# Patient Record
Sex: FEMALE | Race: WHITE | NOT HISPANIC OR LATINO | Employment: OTHER | URBAN - METROPOLITAN AREA
[De-identification: names, ages, dates, MRNs, and addresses within clinical notes are randomized per-mention and may not be internally consistent; named-entity substitution may affect disease eponyms.]

---

## 2020-02-10 ENCOUNTER — HOSPITAL ENCOUNTER (INPATIENT)
Facility: HOSPITAL | Age: 80
LOS: 4 days | Discharge: NURSING, REHAB OR LONG-TERM CARE | End: 2020-02-14
Attending: EMERGENCY MEDICINE | Admitting: ORTHOPAEDIC SURGERY

## 2020-02-10 ENCOUNTER — APPOINTMENT (OUTPATIENT)
Dept: GENERAL RADIOLOGY | Facility: HOSPITAL | Age: 80
End: 2020-02-10

## 2020-02-10 DIAGNOSIS — J44.9 CHRONIC OBSTRUCTIVE PULMONARY DISEASE, UNSPECIFIED COPD TYPE (HCC): ICD-10-CM

## 2020-02-10 DIAGNOSIS — I10 ESSENTIAL HYPERTENSION: ICD-10-CM

## 2020-02-10 DIAGNOSIS — S72.001A CLOSED RIGHT HIP FRACTURE, INITIAL ENCOUNTER (HCC): Primary | ICD-10-CM

## 2020-02-10 PROBLEM — J45.909 ASTHMA: Status: ACTIVE | Noted: 2020-02-10
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
## 2020-02-10 LAB
ABO GROUP BLD: NORMAL
ALBUMIN SERPL-MCNC: 4.5 G/DL (ref 3.5–5.2)
ALBUMIN/GLOB SERPL: 1.7 G/DL
ALP SERPL-CCNC: 126 U/L (ref 39–117)
ALT SERPL W P-5'-P-CCNC: 20 U/L (ref 1–33)
ANION GAP SERPL CALCULATED.3IONS-SCNC: 15.7 MMOL/L (ref 5–15)
AST SERPL-CCNC: 21 U/L (ref 1–32)
BASOPHILS # BLD AUTO: 0.03 10*3/MM3 (ref 0–0.2)
BASOPHILS NFR BLD AUTO: 0.5 % (ref 0–1.5)
BILIRUB SERPL-MCNC: 0.6 MG/DL (ref 0.2–1.2)
BLD GP AB SCN SERPL QL: NEGATIVE
BUN BLD-MCNC: 11 MG/DL (ref 8–23)
BUN/CREAT SERPL: 17.2 (ref 7–25)
CALCIUM SPEC-SCNC: 9.7 MG/DL (ref 8.6–10.5)
CHLORIDE SERPL-SCNC: 98 MMOL/L (ref 98–107)
CO2 SERPL-SCNC: 23.3 MMOL/L (ref 22–29)
CREAT BLD-MCNC: 0.64 MG/DL (ref 0.57–1)
DEPRECATED RDW RBC AUTO: 43.3 FL (ref 37–54)
EOSINOPHIL # BLD AUTO: 0 10*3/MM3 (ref 0–0.4)
EOSINOPHIL NFR BLD AUTO: 0 % (ref 0.3–6.2)
ERYTHROCYTE [DISTWIDTH] IN BLOOD BY AUTOMATED COUNT: 13.6 % (ref 12.3–15.4)
GFR SERPL CREATININE-BSD FRML MDRD: 90 ML/MIN/1.73
GLOBULIN UR ELPH-MCNC: 2.6 GM/DL
GLUCOSE BLD-MCNC: 103 MG/DL (ref 65–99)
HCT VFR BLD AUTO: 41.5 % (ref 34–46.6)
HGB BLD-MCNC: 14 G/DL (ref 12–15.9)
IMM GRANULOCYTES # BLD AUTO: 0.03 10*3/MM3 (ref 0–0.05)
IMM GRANULOCYTES NFR BLD AUTO: 0.5 % (ref 0–0.5)
INR PPP: 0.92 (ref 0.9–1.1)
LYMPHOCYTES # BLD AUTO: 0.66 10*3/MM3 (ref 0.7–3.1)
LYMPHOCYTES NFR BLD AUTO: 10 % (ref 19.6–45.3)
MCH RBC QN AUTO: 29.5 PG (ref 26.6–33)
MCHC RBC AUTO-ENTMCNC: 33.7 G/DL (ref 31.5–35.7)
MCV RBC AUTO: 87.6 FL (ref 79–97)
MONOCYTES # BLD AUTO: 0.3 10*3/MM3 (ref 0.1–0.9)
MONOCYTES NFR BLD AUTO: 4.6 % (ref 5–12)
NEUTROPHILS # BLD AUTO: 5.55 10*3/MM3 (ref 1.7–7)
NEUTROPHILS NFR BLD AUTO: 84.4 % (ref 42.7–76)
NRBC BLD AUTO-RTO: 0 /100 WBC (ref 0–0.2)
PLATELET # BLD AUTO: 176 10*3/MM3 (ref 140–450)
PMV BLD AUTO: 9.6 FL (ref 6–12)
POTASSIUM BLD-SCNC: 3.6 MMOL/L (ref 3.5–5.2)
PROT SERPL-MCNC: 7.1 G/DL (ref 6–8.5)
PROTHROMBIN TIME: 12.1 SECONDS (ref 11.7–14.2)
RBC # BLD AUTO: 4.74 10*6/MM3 (ref 3.77–5.28)
RH BLD: POSITIVE
SODIUM BLD-SCNC: 137 MMOL/L (ref 136–145)
T&S EXPIRATION DATE: NORMAL
WBC NRBC COR # BLD: 6.57 10*3/MM3 (ref 3.4–10.8)

## 2020-02-10 PROCEDURE — 93005 ELECTROCARDIOGRAM TRACING: CPT | Performed by: EMERGENCY MEDICINE

## 2020-02-10 PROCEDURE — 93010 ELECTROCARDIOGRAM REPORT: CPT | Performed by: INTERNAL MEDICINE

## 2020-02-10 PROCEDURE — 25010000002 ONDANSETRON PER 1 MG: Performed by: EMERGENCY MEDICINE

## 2020-02-10 PROCEDURE — 99284 EMERGENCY DEPT VISIT MOD MDM: CPT

## 2020-02-10 PROCEDURE — 86850 RBC ANTIBODY SCREEN: CPT | Performed by: EMERGENCY MEDICINE

## 2020-02-10 PROCEDURE — 86901 BLOOD TYPING SEROLOGIC RH(D): CPT | Performed by: EMERGENCY MEDICINE

## 2020-02-10 PROCEDURE — 71045 X-RAY EXAM CHEST 1 VIEW: CPT

## 2020-02-10 PROCEDURE — 85610 PROTHROMBIN TIME: CPT | Performed by: EMERGENCY MEDICINE

## 2020-02-10 PROCEDURE — 85025 COMPLETE CBC W/AUTO DIFF WBC: CPT | Performed by: EMERGENCY MEDICINE

## 2020-02-10 PROCEDURE — 86900 BLOOD TYPING SEROLOGIC ABO: CPT | Performed by: EMERGENCY MEDICINE

## 2020-02-10 PROCEDURE — 80053 COMPREHEN METABOLIC PANEL: CPT | Performed by: EMERGENCY MEDICINE

## 2020-02-10 PROCEDURE — 73502 X-RAY EXAM HIP UNI 2-3 VIEWS: CPT

## 2020-02-10 PROCEDURE — 25010000002 MORPHINE PER 10 MG: Performed by: INTERNAL MEDICINE

## 2020-02-10 PROCEDURE — 25010000002 HYDROMORPHONE PER 4 MG: Performed by: EMERGENCY MEDICINE

## 2020-02-10 RX ORDER — IPRATROPIUM BROMIDE AND ALBUTEROL SULFATE 2.5; .5 MG/3ML; MG/3ML
3 SOLUTION RESPIRATORY (INHALATION) EVERY 4 HOURS PRN
Status: DISCONTINUED | OUTPATIENT
Start: 2020-02-10 | End: 2020-02-14 | Stop reason: HOSPADM

## 2020-02-10 RX ORDER — CEFAZOLIN SODIUM 2 G/100ML
2 INJECTION, SOLUTION INTRAVENOUS
Status: COMPLETED | OUTPATIENT
Start: 2020-02-11 | End: 2020-02-11

## 2020-02-10 RX ORDER — HYDROCODONE BITARTRATE AND ACETAMINOPHEN 5; 325 MG/1; MG/1
1 TABLET ORAL EVERY 6 HOURS PRN
Status: DISCONTINUED | OUTPATIENT
Start: 2020-02-10 | End: 2020-02-12

## 2020-02-10 RX ORDER — SODIUM CHLORIDE 0.9 % (FLUSH) 0.9 %
10 SYRINGE (ML) INJECTION EVERY 12 HOURS SCHEDULED
Status: DISCONTINUED | OUTPATIENT
Start: 2020-02-10 | End: 2020-02-14 | Stop reason: HOSPADM

## 2020-02-10 RX ORDER — SENNA AND DOCUSATE SODIUM 50; 8.6 MG/1; MG/1
2 TABLET, FILM COATED ORAL 2 TIMES DAILY
Status: DISCONTINUED | OUTPATIENT
Start: 2020-02-10 | End: 2020-02-14 | Stop reason: HOSPADM

## 2020-02-10 RX ORDER — CETIRIZINE HYDROCHLORIDE 10 MG/1
10 TABLET ORAL DAILY
COMMUNITY

## 2020-02-10 RX ORDER — HYDROMORPHONE HYDROCHLORIDE 1 MG/ML
0.5 INJECTION, SOLUTION INTRAMUSCULAR; INTRAVENOUS; SUBCUTANEOUS ONCE
Status: COMPLETED | OUTPATIENT
Start: 2020-02-10 | End: 2020-02-10

## 2020-02-10 RX ORDER — SODIUM CHLORIDE 0.9 % (FLUSH) 0.9 %
10 SYRINGE (ML) INJECTION AS NEEDED
Status: DISCONTINUED | OUTPATIENT
Start: 2020-02-10 | End: 2020-02-14 | Stop reason: HOSPADM

## 2020-02-10 RX ORDER — SODIUM CHLORIDE, SODIUM LACTATE, POTASSIUM CHLORIDE, CALCIUM CHLORIDE 600; 310; 30; 20 MG/100ML; MG/100ML; MG/100ML; MG/100ML
100 INJECTION, SOLUTION INTRAVENOUS CONTINUOUS
Status: DISCONTINUED | OUTPATIENT
Start: 2020-02-10 | End: 2020-02-12

## 2020-02-10 RX ORDER — ONDANSETRON 2 MG/ML
4 INJECTION INTRAMUSCULAR; INTRAVENOUS EVERY 6 HOURS PRN
Status: DISCONTINUED | OUTPATIENT
Start: 2020-02-10 | End: 2020-02-14 | Stop reason: HOSPADM

## 2020-02-10 RX ORDER — ACETAMINOPHEN 650 MG/1
650 SUPPOSITORY RECTAL EVERY 4 HOURS PRN
Status: DISCONTINUED | OUTPATIENT
Start: 2020-02-10 | End: 2020-02-12

## 2020-02-10 RX ORDER — CETIRIZINE HYDROCHLORIDE 10 MG/1
10 TABLET ORAL DAILY
Status: DISCONTINUED | OUTPATIENT
Start: 2020-02-10 | End: 2020-02-14 | Stop reason: HOSPADM

## 2020-02-10 RX ORDER — ACETAMINOPHEN 160 MG/5ML
650 SOLUTION ORAL EVERY 4 HOURS PRN
Status: DISCONTINUED | OUTPATIENT
Start: 2020-02-10 | End: 2020-02-12

## 2020-02-10 RX ORDER — ACETAMINOPHEN 325 MG/1
650 TABLET ORAL EVERY 4 HOURS PRN
Status: DISCONTINUED | OUTPATIENT
Start: 2020-02-10 | End: 2020-02-14 | Stop reason: HOSPADM

## 2020-02-10 RX ORDER — ONDANSETRON 2 MG/ML
4 INJECTION INTRAMUSCULAR; INTRAVENOUS ONCE
Status: COMPLETED | OUTPATIENT
Start: 2020-02-10 | End: 2020-02-10

## 2020-02-10 RX ORDER — MORPHINE SULFATE 2 MG/ML
2 INJECTION, SOLUTION INTRAMUSCULAR; INTRAVENOUS
Status: DISCONTINUED | OUTPATIENT
Start: 2020-02-10 | End: 2020-02-12

## 2020-02-10 RX ADMIN — CETIRIZINE HYDROCHLORIDE 10 MG: 10 TABLET, FILM COATED ORAL at 20:30

## 2020-02-10 RX ADMIN — ONDANSETRON 4 MG: 2 INJECTION INTRAMUSCULAR; INTRAVENOUS at 12:45

## 2020-02-10 RX ADMIN — HYDROCODONE BITARTRATE AND ACETAMINOPHEN 1 TABLET: 5; 325 TABLET ORAL at 20:30

## 2020-02-10 RX ADMIN — SODIUM CHLORIDE, POTASSIUM CHLORIDE, SODIUM LACTATE AND CALCIUM CHLORIDE 100 ML/HR: 600; 310; 30; 20 INJECTION, SOLUTION INTRAVENOUS at 12:54

## 2020-02-10 RX ADMIN — SODIUM CHLORIDE, POTASSIUM CHLORIDE, SODIUM LACTATE AND CALCIUM CHLORIDE 100 ML/HR: 600; 310; 30; 20 INJECTION, SOLUTION INTRAVENOUS at 23:03

## 2020-02-10 RX ADMIN — MORPHINE SULFATE 2 MG: 2 INJECTION, SOLUTION INTRAMUSCULAR; INTRAVENOUS at 23:03

## 2020-02-10 RX ADMIN — MORPHINE SULFATE 2 MG: 2 INJECTION, SOLUTION INTRAMUSCULAR; INTRAVENOUS at 15:42

## 2020-02-10 RX ADMIN — MORPHINE SULFATE 2 MG: 2 INJECTION, SOLUTION INTRAMUSCULAR; INTRAVENOUS at 19:19

## 2020-02-10 RX ADMIN — SENNOSIDES AND DOCUSATE SODIUM 2 TABLET: 8.6; 5 TABLET ORAL at 20:30

## 2020-02-10 RX ADMIN — HYDROMORPHONE HYDROCHLORIDE 0.5 MG: 1 INJECTION, SOLUTION INTRAMUSCULAR; INTRAVENOUS; SUBCUTANEOUS at 12:49

## 2020-02-10 NOTE — PROGRESS NOTES
Discharge Planning Assessment  [facility]     Patient Name: Deborah Mcclelland  MRN: [id number]  Today's Date: 2/10/2020    Admit Date: 2/10/2020    Discharge Needs Assessment     Row Name 02/10/20 1618       Living Environment    Lives With  alone    Current Living Arrangements  home/apartment/condo    Potentially Unsafe Housing Conditions  other (see comments)    Provides Primary Care For  no one    Family Caregiver if Needed  child(jennifer), adult    Quality of Family Relationships  helpful;involved;supportive    Able to Return to Prior Arrangements  yes       Resource/Environmental Concerns    Resource/Environmental Concerns  none    Transportation Concerns  car, none       Transition Planning    Patient/Family Anticipates Transition to  home    Patient/Family Anticipated Services at Transition  home health care;skilled nursing    Transportation Anticipated  family or friend will provide       Discharge Needs Assessment    Readmission Within the Last 30 Days  no previous admission in last 30 days    Concerns to be Addressed  no discharge needs identified;denies needs/concerns at this time    Equipment Currently Used at Home  none    Anticipated Changes Related to Illness  none    Equipment Needed After Discharge  none    Outpatient/Agency/Support Group Needs  skilled nursing facility;homecare agency    Discharge Facility/Level of Care Needs  home with home health;nursing facility, skilled        Discharge Plan     Row Name 02/10/20 1620       Plan    Plan  Follow up after surgery; HH/SNF list     Patient/Family in Agreement with Plan  yes    Plan Comments  CCP met with patient, patient's son and daughter (Selam [phone] and Musa [phone]) at bedside. CCP role explained and discharge planning discussed. Face sheet verified and IMM noted. Patient's PCP is Dr. Ryder. Patient lives alone with 4 steps to the entrance of her single story home. Patient is independent with her ADLS and does not use DME.  Patient has used home health in the past but could not recall which agency. Patient denies any subacute rehab in the past. Patient's preferred pharmacy is [facility] [address]. Patient to have possible surgery. CCP left HH/SNF list at bedside. CCP will follow for skilled needs and PT eval to help determine discharge plan. Gaye ANDRADE            Destination      Coordination has not been started for this encounter.      Durable Medical Equipment      Coordination has not been started for this encounter.      Dialysis/Infusion      Coordination has not been started for this encounter.      Home Medical Care      Coordination has not been started for this encounter.      Therapy      Coordination has not been started for this encounter.      Community Resources      Coordination has not been started for this encounter.          Demographic Summary     Row Name 02/10/20 1617       General Information    Admission Type  inpatient    Arrived From  emergency department    Required Notices Provided  Important Message from Medicare    Referral Source  admission list    Reason for Consult  discharge planning    Preferred Language  English        Functional Status     Row Name 02/10/20 1618       Functional Status    Usual Activity Tolerance  good    Current Activity Tolerance  good       Functional Status, IADL    Medications  independent    Meal Preparation  independent    Housekeeping  independent    Laundry  independent    Shopping  independent       Mental Status    General Appearance WDL  WDL       Mental Status Summary    Recent Changes in Mental Status/Cognitive Functioning  no changes        Psychosocial    No documentation.       Abuse/Neglect    No documentation.       Legal    No documentation.       Substance Abuse    No documentation.       Patient Forms    No documentation.           LUPE Oquendo

## 2020-02-10 NOTE — ED TRIAGE NOTES
Tripped and fell on concrete patio.  Did not hit head.  No loc.  No neck or back pain.  C/o right hip pain

## 2020-02-10 NOTE — PLAN OF CARE
Received from ER @ 1440, pt fell at home on stairs, admitted with R hip fracture, admitted to [facility], Dr. Zach Roy will see in the am, purewick in place, bilateral SCDs, accumax pump, waffle boots ordered, but not to floor, morphine for pain, regular diet, npo after midnight, plan is for surgery tomorrow

## 2020-02-10 NOTE — H&P
Patient Name:  Deborah Mcclelland  YOB: 1940  MRN:  [id number]  Admit Date:  2/10/2020  Patient Care Team:  Sean Ryder MD as PCP - General (Internal Medicine)      Subjective   History Present Illness     Chief Complaint   Patient presents with   • Fall   • Hip Pain       Ms. Mcclelland is a 79 y.o. with a history of COPD that presents to [facility] after sustaining a mechanical fall at home this morning. Patient was leaving the house to go to the [facility] when she tripped going down her stairs. She reports falling backwards but denies head injury, LOC, chest pain, palpitations, SOA, edema, fever, chills, nausea and vomiting. She lives at home alone and reports previous left hip replacement in 2012.     History of Present Illness  Review of Systems   Constitutional: Negative for chills and fever.   HENT: Negative for congestion and facial swelling.    Eyes: Negative for discharge and itching.   Respiratory: Negative for chest tightness and shortness of breath.    Cardiovascular: Negative for chest pain, palpitations and leg swelling.   Gastrointestinal: Negative for nausea and vomiting.   Endocrine: Negative for cold intolerance and heat intolerance.   Genitourinary: Negative for difficulty urinating and dysuria.   Musculoskeletal: Negative for arthralgias and back pain.   Skin: Negative for color change and pallor.   Allergic/Immunologic: Negative for environmental allergies and food allergies.   Neurological: Negative for dizziness and headaches.   Hematological: Negative for adenopathy. Does not bruise/bleed easily.   Psychiatric/Behavioral: Negative for agitation and behavioral problems.        Personal History     Past Medical History:   Diagnosis Date   • Asthma    • COPD (chronic obstructive pulmonary disease) (CMS/[facility])      Past Surgical History:   Procedure Laterality Date   • HIP SURGERY Left    • KNEE SURGERY Bilateral      History reviewed. No pertinent family  history.  Social History     Tobacco Use   • Smoking status: Never Smoker   Substance Use Topics   • Alcohol use: Never     Frequency: Never   • Drug use: Never     No current facility-administered medications on file prior to encounter.      Current Outpatient Medications on File Prior to Encounter   Medication Sig Dispense Refill   • cetirizine (zyrTEC) 10 MG tablet Take 10 mg by mouth Daily.     • ipratropium-albuterol (COMBIVENT RESPIMAT)  MCG/ACT inhaler Inhale 1 puff 4 (Four) Times a Day As Needed for Wheezing.     • Unable to find 1 each 1 (One) Time. Med Name: Breathing med starts with letter M       No Known Allergies    Objective    Objective     Vital Signs  Temp:  [98.3 °F (36.8 °C)] 98.3 °F (36.8 °C)  Heart Rate:  [89] 89  Resp:  [16] 16  BP: (172-200)/() 186/80  SpO2:  [100 %] 100 %  on   ;   Device (Oxygen Therapy): room air  There is no height or weight on file to calculate BMI.    Physical Exam   Constitutional: She is oriented to person, place, and time. She appears well-developed and well-nourished. No distress.   HENT:   Head: Normocephalic and atraumatic.   Eyes: Pupils are equal, round, and reactive to light. EOM are normal.   Neck: Normal range of motion. Neck supple.   Cardiovascular: Normal rate and regular rhythm.   Pulmonary/Chest: Effort normal and breath sounds normal. No respiratory distress.   Abdominal: Soft. Bowel sounds are normal. She exhibits no distension. There is no tenderness.   Musculoskeletal: She exhibits tenderness. She exhibits no edema.   Neurological: She is alert and oriented to person, place, and time.   Psychiatric: She has a normal mood and affect. Her behavior is normal.   Nursing note and vitals reviewed.      Results Review:  I reviewed the patient's new clinical results.  I reviewed the patient's new imaging results and agree with the interpretation.  I reviewed the patient's other test results and agree with the interpretation  I personally viewed  and interpreted the patient's EKG/Telemetry data  Discussed with ED provider.    Lab Results (last 24 hours)     Procedure Component Value Units Date/Time    CBC & Differential [[id number]] Collected:  02/10/20 1247    Specimen:  Blood Updated:  02/10/20 1306    Narrative:       The following orders were created for panel order CBC & Differential.  Procedure                               Abnormality         Status                     ---------                               -----------         ------                     CBC Auto Differential[[id number]]        Abnormal            Final result                 Please view results for these tests on the individual orders.    Comprehensive Metabolic Panel [[id number]]  (Abnormal) Collected:  02/10/20 1247    Specimen:  Blood Updated:  02/10/20 1327     Glucose 103 mg/dL      BUN 11 mg/dL      Creatinine 0.64 mg/dL      Sodium 137 mmol/L      Potassium 3.6 mmol/L      Chloride 98 mmol/L      CO2 23.3 mmol/L      Calcium 9.7 mg/dL      Total Protein 7.1 g/dL      Albumin 4.50 g/dL      ALT (SGPT) 20 U/L      AST (SGOT) 21 U/L      Alkaline Phosphatase 126 U/L      Total Bilirubin 0.6 mg/dL      eGFR Non African Amer 90 mL/min/1.73      Globulin 2.6 gm/dL      A/G Ratio 1.7 g/dL      BUN/Creatinine Ratio 17.2     Anion Gap 15.7 mmol/L     Narrative:       GFR Normal >60  Chronic Kidney Disease <60  Kidney Failure <15      Protime-INR [[id number]]  (Normal) Collected:  02/10/20 1247    Specimen:  Blood Updated:  02/10/20 1316     Protime 12.1 Seconds      INR 0.92    CBC Auto Differential [[id number]]  (Abnormal) Collected:  02/10/20 1247    Specimen:  Blood Updated:  02/10/20 1306     WBC 6.57 10*3/mm3      RBC 4.74 10*6/mm3      Hemoglobin 14.0 g/dL      Hematocrit 41.5 %      MCV 87.6 fL      MCH 29.5 pg      MCHC 33.7 g/dL      RDW 13.6 %      RDW-SD 43.3 fl      MPV 9.6 fL      Platelets 176 10*3/mm3      Neutrophil % 84.4 %      Lymphocyte % 10.0 %      Monocyte % 4.6 %       Eosinophil % 0.0 %      Basophil % 0.5 %      Immature Grans % 0.5 %      Neutrophils, Absolute 5.55 10*3/mm3      Lymphocytes, Absolute 0.66 10*3/mm3      Monocytes, Absolute 0.30 10*3/mm3      Eosinophils, Absolute 0.00 10*3/mm3      Basophils, Absolute 0.03 10*3/mm3      Immature Grans, Absolute 0.03 10*3/mm3      nRBC 0.0 /100 WBC           Imaging Results (Last 24 Hours)     Procedure Component Value Units Date/Time    XR Chest 1 View [[id number]] Collected:  02/10/20 1227     Updated:  02/10/20 1236    Narrative:       XR CHEST 1 VW-     HISTORY: Female who is 79 years-old,  preoperative evaluation     TECHNIQUE: Frontal views of the chest     COMPARISON: 06/25/2013     FINDINGS: Heart size is normal. Aorta is calcified. Pulmonary  vasculature is unremarkable. No focal pulmonary consolidation, pleural  effusion, or pneumothorax is seen, limited by supine positioning.  Chronic appearing right rib deformities.       Impression:       No focal pulmonary consolidation.     This report was finalized on 2/10/2020 12:33 PM by Dr. Kendall Sanford M.D.       XR Hip With or Without Pelvis 2 - 3 View Right [[id number]] Collected:  02/10/20 1226     Updated:  02/10/20 1230    Narrative:       XR HIP W OR WO PELVIS 2-3 VIEW RIGHT-     INDICATIONS: Trauma     TECHNIQUE: Frontal view of the pelvis, 2 views of the right hip     COMPARISON: None available     FINDINGS:  Angulated subcapital right femoral neck fracture is noted. No other  fractures are identified. The bones are diffusely osteopenic. Left hip  arthroplasty hardware is partly included. Degenerative changes are seen  in the partly included lower lumbar spine. Nonspecific pelvic soft  tissue calcifications.          Impression:          Right femoral neck fracture.     This report was finalized on 2/10/2020 12:27 PM by Dr. Kendall Sanford M.D.                  ECG 12 Lead   Final Result   HEART RATE= 72  bpm   RR Interval= 836  ms   WV Interval= 171  ms   P  Horizontal Axis= 7  deg   P Front Axis= 81  deg   QRSD Interval= 102  ms   QT Interval= 419  ms   QRS Axis= -32  deg   T Wave Axis=   deg   - ABNORMAL ECG -   Sinus rhythm   Inferior infarct, old   Minimal ST elevation, anterior leads New compared to previous   Electronically Signed By: Kevan Mustafa ([facility]) 10-Feb-2020 13:15:03   Date and Time of Study: 2020-02-10 12:09:08           Assessment/Plan     Active Hospital Problems    Diagnosis  POA   • **Closed right hip fracture, initial encounter (CMS/[facility]) [S72.001A]  Yes   • COPD (chronic obstructive pulmonary disease) (CMS/[facility]) [J44.9]  Yes   • Asthma [J45.909]  Yes   • Fall [W19.XXXA]  Unknown      Resolved Hospital Problems   No resolved problems to display.     Right femur fracture   - NPO  - ortho to see  - pain control  - bedrest until evaluated by ortho  - PT/OT when appropriate  - CCP for placement at DC    COPD  - stable     · I discussed the patient's findings and my recommendations with patient, family and nursing staff.    VTE Prophylaxis - SCDs.  Code Status - Full code.       KAVON Dale  [facility]  02/10/20  2:09 PM

## 2020-02-10 NOTE — ED PROVIDER NOTES
EMERGENCY DEPARTMENT ENCOUNTER    Room Number:  29/29  Date of encounter:  2/10/2020  PCP: Sean Ryder MD  Historian: Patient       HPI:  Chief Complaint: Right hip pain  A complete HPI/ROS/PMH/PSH/SH/FH are unobtainable due to: None    Context: Deborah Mcclelland is a 79 y.o. female who presents to the ED c/o right hip pain after fall at home.  Patient denies other injury.  Denies loss of consciousness or neck or back pain.  Pain is described as severe and worsened by movement.  Patient does have history of prior left hip surgery but no surgery on the right.  Primary care provider is Dr. Ryder.  Patient lives at home alone.      PAST MEDICAL HISTORY  Active Ambulatory Problems     Diagnosis Date Noted   • No Active Ambulatory Problems     Resolved Ambulatory Problems     Diagnosis Date Noted   • No Resolved Ambulatory Problems     Past Medical History:   Diagnosis Date   • Asthma    • COPD (chronic obstructive pulmonary disease) (CMS/[facility])          PAST SURGICAL HISTORY  Past Surgical History:   Procedure Laterality Date   • HIP SURGERY Left    • KNEE SURGERY Bilateral          FAMILY HISTORY  History reviewed. No pertinent family history.      SOCIAL HISTORY  Social History     Socioeconomic History   • Marital status:      Spouse name: Not on file   • Number of children: Not on file   • Years of education: Not on file   • Highest education level: Not on file   Tobacco Use   • Smoking status: Never Smoker   Substance and Sexual Activity   • Alcohol use: Never     Frequency: Never   • Drug use: Never         ALLERGIES  Patient has no known allergies.       REVIEW OF SYSTEMS  Review of Systems   Constitutional: Negative.  Negative for fever.   HENT: Negative.  Negative for sore throat.    Eyes: Negative.    Respiratory: Negative.  Negative for cough.    Cardiovascular: Negative.  Negative for chest pain.   Gastrointestinal: Negative.    Genitourinary: Negative.  Negative for dysuria.   Musculoskeletal:  Negative.  Negative for back pain.        Right hip pain   Skin: Negative.  Negative for rash.   Neurological: Negative.  Negative for headaches.   All other systems reviewed and are negative.          PHYSICAL EXAM    I have reviewed the triage vital signs and nursing notes.    ED Triage Vitals [02/10/20 1050]   Temp Heart Rate Resp BP SpO2   98.3 °F (36.8 °C) 89 16 (!) 200/111 100 %      Temp src Heart Rate Source Patient Position BP Location FiO2 (%)   Tympanic Monitor -- -- --       Physical Exam  GENERAL: Moderately distressed  HENT: nares patent, atraumatic  EYES: no scleral icterus  CV: regular rhythm, regular rate  RESPIRATORY: normal effort unlabored  ABDOMEN: soft, nontender  MUSCULOSKELETAL: Right hip reveals tenderness to palpation laterally.  There is decreased range of motion.  Distal strength sensation and pulses are grossly intact.  I see no evidence of acute traumatic injury to the spine or other 3 extremities.  NEURO: Strength, sensation, and coordination are grossly intact.  Speech and mentation are unremarkable  SKIN: warm, dry      LAB RESULTS  No results found for this or any previous visit (from the past 24 hour(s)).    Ordered the above labs and independently reviewed the results.      RADIOLOGY  No Radiology Exams Resulted Within Past 24 Hours    I ordered the above noted radiological studies. Reviewed by me and discussed with radiologist.  See dictation for official radiology interpretation.      PROCEDURES  Procedures      MEDICATIONS GIVEN IN ER    Medications   HYDROmorphone (DILAUDID) injection 0.5 mg (has no administration in time range)   ondansetron (ZOFRAN) injection 4 mg (has no administration in time range)         PROGRESS, DATA ANALYSIS, CONSULTS, AND MEDICAL DECISION MAKING    All labs have been independently reviewed by me.  All radiology studies have been reviewed by me and discussed with radiologist dictating the report.   EKG's independently viewed and interpreted by me.   Discussion below represents my analysis of pertinent findings related to patient's condition, differential diagnosis, treatment plan and final disposition.      ED Course as of Feb 10 1222   Mon Feb 10, 2020   1157 MDM exam is consistent with likely right hip fracture.  We will give IV Dilaudid and Zofran to help with pain and nausea.  Will check labs and x-ray, but expect likely admission.    [DB]   1219 I discussed management and care with NP Sangita Vásquez who will admit on behalf of Dr. Hardy.    [DB]   1220 EKG          EKG time: 1209  Rhythm/Rate: Sinus 72  P waves and MO: Normal P waves and MO intervals  QRS, axis: Questionable inferior Q wave, indeterminate axis  ST and T waves: Mild anterior ST elevation    Interpreted Contemporaneously by me, independently viewed  Anterior ST elevation is new compared to 2013      [DB]      ED Course User Index  [DB] Ruperto Bernard MD       AS OF 12:22 PM VITALS:    BP - (!) 200/111  HR - 89  TEMP - 98.3 °F (36.8 °C) (Tympanic)  O2 SATS - 100%      DIAGNOSIS  Final diagnoses:   Closed right hip fracture, initial encounter (CMS/[facility])   Essential hypertension   Chronic obstructive pulmonary disease, unspecified COPD type (CMS/[facility])         DISPOSITION  Admission         Ruperto Bernard MD  02/10/20 1223

## 2020-02-11 ENCOUNTER — ANESTHESIA (OUTPATIENT)
Dept: PERIOP | Facility: HOSPITAL | Age: 80
End: 2020-02-11

## 2020-02-11 ENCOUNTER — APPOINTMENT (OUTPATIENT)
Dept: GENERAL RADIOLOGY | Facility: HOSPITAL | Age: 80
End: 2020-02-11

## 2020-02-11 ENCOUNTER — ANESTHESIA EVENT (OUTPATIENT)
Dept: PERIOP | Facility: HOSPITAL | Age: 80
End: 2020-02-11

## 2020-02-11 LAB
ANION GAP SERPL CALCULATED.3IONS-SCNC: 8.7 MMOL/L (ref 5–15)
BASOPHILS # BLD AUTO: 0.02 10*3/MM3 (ref 0–0.2)
BASOPHILS NFR BLD AUTO: 0.4 % (ref 0–1.5)
BUN BLD-MCNC: 8 MG/DL (ref 8–23)
BUN/CREAT SERPL: 12.9 (ref 7–25)
CALCIUM SPEC-SCNC: 8.6 MG/DL (ref 8.6–10.5)
CHLORIDE SERPL-SCNC: 99 MMOL/L (ref 98–107)
CO2 SERPL-SCNC: 24.3 MMOL/L (ref 22–29)
CREAT BLD-MCNC: 0.62 MG/DL (ref 0.57–1)
DEPRECATED RDW RBC AUTO: 42.4 FL (ref 37–54)
EOSINOPHIL # BLD AUTO: 0.09 10*3/MM3 (ref 0–0.4)
EOSINOPHIL NFR BLD AUTO: 1.8 % (ref 0.3–6.2)
ERYTHROCYTE [DISTWIDTH] IN BLOOD BY AUTOMATED COUNT: 13.4 % (ref 12.3–15.4)
GFR SERPL CREATININE-BSD FRML MDRD: 93 ML/MIN/1.73
GLUCOSE BLD-MCNC: 130 MG/DL (ref 65–99)
HCT VFR BLD AUTO: 37.7 % (ref 34–46.6)
HGB BLD-MCNC: 12.8 G/DL (ref 12–15.9)
IMM GRANULOCYTES # BLD AUTO: 0.01 10*3/MM3 (ref 0–0.05)
IMM GRANULOCYTES NFR BLD AUTO: 0.2 % (ref 0–0.5)
LYMPHOCYTES # BLD AUTO: 0.62 10*3/MM3 (ref 0.7–3.1)
LYMPHOCYTES NFR BLD AUTO: 12.7 % (ref 19.6–45.3)
MCH RBC QN AUTO: 29.4 PG (ref 26.6–33)
MCHC RBC AUTO-ENTMCNC: 34 G/DL (ref 31.5–35.7)
MCV RBC AUTO: 86.7 FL (ref 79–97)
MONOCYTES # BLD AUTO: 0.37 10*3/MM3 (ref 0.1–0.9)
MONOCYTES NFR BLD AUTO: 7.6 % (ref 5–12)
NEUTROPHILS # BLD AUTO: 3.76 10*3/MM3 (ref 1.7–7)
NEUTROPHILS NFR BLD AUTO: 77.3 % (ref 42.7–76)
NRBC BLD AUTO-RTO: 0 /100 WBC (ref 0–0.2)
PLATELET # BLD AUTO: 155 10*3/MM3 (ref 140–450)
PMV BLD AUTO: 9.4 FL (ref 6–12)
POTASSIUM BLD-SCNC: 4 MMOL/L (ref 3.5–5.2)
RBC # BLD AUTO: 4.35 10*6/MM3 (ref 3.77–5.28)
SODIUM BLD-SCNC: 132 MMOL/L (ref 136–145)
WBC NRBC COR # BLD: 4.87 10*3/MM3 (ref 3.4–10.8)

## 2020-02-11 PROCEDURE — 76000 FLUOROSCOPY <1 HR PHYS/QHP: CPT

## 2020-02-11 PROCEDURE — 25010000002 FENTANYL CITRATE (PF) 100 MCG/2ML SOLUTION: Performed by: ANESTHESIOLOGY

## 2020-02-11 PROCEDURE — 25010000002 MORPHINE PER 10 MG: Performed by: INTERNAL MEDICINE

## 2020-02-11 PROCEDURE — 25010000002 SUCCINYLCHOLINE PER 20 MG: Performed by: ANESTHESIOLOGY

## 2020-02-11 PROCEDURE — 73501 X-RAY EXAM HIP UNI 1 VIEW: CPT

## 2020-02-11 PROCEDURE — C9290 INJ, BUPIVACAINE LIPOSOME: HCPCS | Performed by: ORTHOPAEDIC SURGERY

## 2020-02-11 PROCEDURE — 36415 COLL VENOUS BLD VENIPUNCTURE: CPT | Performed by: NURSE PRACTITIONER

## 2020-02-11 PROCEDURE — C1776 JOINT DEVICE (IMPLANTABLE): HCPCS | Performed by: ORTHOPAEDIC SURGERY

## 2020-02-11 PROCEDURE — 25010000002 ONDANSETRON PER 1 MG: Performed by: NURSE PRACTITIONER

## 2020-02-11 PROCEDURE — 25010000002 PHENYLEPHRINE PER 1 ML: Performed by: ANESTHESIOLOGY

## 2020-02-11 PROCEDURE — 94799 UNLISTED PULMONARY SVC/PX: CPT

## 2020-02-11 PROCEDURE — 25010000002 HYDROMORPHONE PER 4 MG: Performed by: ANESTHESIOLOGY

## 2020-02-11 PROCEDURE — 80048 BASIC METABOLIC PNL TOTAL CA: CPT | Performed by: NURSE PRACTITIONER

## 2020-02-11 PROCEDURE — 25010000002 NEOSTIGMINE PER 0.5 MG: Performed by: ANESTHESIOLOGY

## 2020-02-11 PROCEDURE — 25010000003 BUPIVACAINE LIPOSOME 1.3 % SUSPENSION 20 ML VIAL: Performed by: ORTHOPAEDIC SURGERY

## 2020-02-11 PROCEDURE — 25010000002 PROMETHAZINE PER 50 MG: Performed by: ANESTHESIOLOGY

## 2020-02-11 PROCEDURE — 25010000003 CEFAZOLIN IN DEXTROSE 2-4 GM/100ML-% SOLUTION: Performed by: ORTHOPAEDIC SURGERY

## 2020-02-11 PROCEDURE — 0SR906A REPLACEMENT OF RIGHT HIP JOINT WITH OXIDIZED ZIRCONIUM ON POLYETHYLENE SYNTHETIC SUBSTITUTE, UNCEMENTED, OPEN APPROACH: ICD-10-PCS | Performed by: ORTHOPAEDIC SURGERY

## 2020-02-11 PROCEDURE — 94640 AIRWAY INHALATION TREATMENT: CPT

## 2020-02-11 PROCEDURE — 85025 COMPLETE CBC W/AUTO DIFF WBC: CPT | Performed by: NURSE PRACTITIONER

## 2020-02-11 PROCEDURE — 25010000002 PROPOFOL 10 MG/ML EMULSION: Performed by: ANESTHESIOLOGY

## 2020-02-11 DEVICE — REFLECTION SPHERICAL HEAD SCREW 25MM
Type: IMPLANTABLE DEVICE | Status: FUNCTIONAL
Brand: REFLECTION

## 2020-02-11 DEVICE — R3 0 DEGREE XLPE ACETABULAR LINER                                    36MM INNER DIAMETER X OUTER DIAMETER 52MM
Type: IMPLANTABLE DEVICE | Status: FUNCTIONAL
Brand: R3

## 2020-02-11 DEVICE — OXINIUM FEMORAL HEAD 12/14 TAPER                                    36 MM M/+4
Type: IMPLANTABLE DEVICE | Status: FUNCTIONAL
Brand: OXINIUM

## 2020-02-11 DEVICE — REFLECTION SPHERICAL HEAD SCREW 35MM
Type: IMPLANTABLE DEVICE | Status: FUNCTIONAL
Brand: REFLECTION

## 2020-02-11 DEVICE — R3 3 HOLE ACETABULAR SHELL 52MM
Type: IMPLANTABLE DEVICE | Status: FUNCTIONAL
Brand: R3 ACETABULAR

## 2020-02-11 DEVICE — IMPLANTABLE DEVICE: Type: IMPLANTABLE DEVICE | Status: FUNCTIONAL

## 2020-02-11 DEVICE — POLARSTEM LATERAL NON-CEMENTED                                    WITH TI/HA 2
Type: IMPLANTABLE DEVICE | Status: FUNCTIONAL
Brand: POLARSTEM

## 2020-02-11 RX ORDER — SODIUM CHLORIDE, SODIUM LACTATE, POTASSIUM CHLORIDE, CALCIUM CHLORIDE 600; 310; 30; 20 MG/100ML; MG/100ML; MG/100ML; MG/100ML
9 INJECTION, SOLUTION INTRAVENOUS CONTINUOUS
Status: DISCONTINUED | OUTPATIENT
Start: 2020-02-11 | End: 2020-02-12

## 2020-02-11 RX ORDER — LIDOCAINE HYDROCHLORIDE 10 MG/ML
0.5 INJECTION, SOLUTION EPIDURAL; INFILTRATION; INTRACAUDAL; PERINEURAL ONCE AS NEEDED
Status: DISCONTINUED | OUTPATIENT
Start: 2020-02-11 | End: 2020-02-11 | Stop reason: HOSPADM

## 2020-02-11 RX ORDER — PROPOFOL 10 MG/ML
VIAL (ML) INTRAVENOUS AS NEEDED
Status: DISCONTINUED | OUTPATIENT
Start: 2020-02-11 | End: 2020-02-11 | Stop reason: SURG

## 2020-02-11 RX ORDER — PROMETHAZINE HYDROCHLORIDE 25 MG/1
25 TABLET ORAL ONCE AS NEEDED
Status: COMPLETED | OUTPATIENT
Start: 2020-02-11 | End: 2020-02-11

## 2020-02-11 RX ORDER — HYDROMORPHONE HYDROCHLORIDE 1 MG/ML
0.2 INJECTION, SOLUTION INTRAMUSCULAR; INTRAVENOUS; SUBCUTANEOUS
Status: DISCONTINUED | OUTPATIENT
Start: 2020-02-11 | End: 2020-02-11 | Stop reason: HOSPADM

## 2020-02-11 RX ORDER — TRANEXAMIC ACID 100 MG/ML
INJECTION, SOLUTION INTRAVENOUS AS NEEDED
Status: DISCONTINUED | OUTPATIENT
Start: 2020-02-11 | End: 2020-02-11 | Stop reason: SURG

## 2020-02-11 RX ORDER — FENTANYL CITRATE 50 UG/ML
25 INJECTION, SOLUTION INTRAMUSCULAR; INTRAVENOUS ONCE
Status: COMPLETED | OUTPATIENT
Start: 2020-02-11 | End: 2020-02-11

## 2020-02-11 RX ORDER — FENTANYL CITRATE 50 UG/ML
INJECTION, SOLUTION INTRAMUSCULAR; INTRAVENOUS AS NEEDED
Status: DISCONTINUED | OUTPATIENT
Start: 2020-02-11 | End: 2020-02-11 | Stop reason: SURG

## 2020-02-11 RX ORDER — FENTANYL CITRATE 50 UG/ML
50 INJECTION, SOLUTION INTRAMUSCULAR; INTRAVENOUS
Status: DISCONTINUED | OUTPATIENT
Start: 2020-02-11 | End: 2020-02-11 | Stop reason: HOSPADM

## 2020-02-11 RX ORDER — PROMETHAZINE HYDROCHLORIDE 25 MG/ML
6.25 INJECTION, SOLUTION INTRAMUSCULAR; INTRAVENOUS ONCE AS NEEDED
Status: COMPLETED | OUTPATIENT
Start: 2020-02-11 | End: 2020-02-11

## 2020-02-11 RX ORDER — DIPHENHYDRAMINE HYDROCHLORIDE 50 MG/ML
12.5 INJECTION INTRAMUSCULAR; INTRAVENOUS
Status: DISCONTINUED | OUTPATIENT
Start: 2020-02-11 | End: 2020-02-11 | Stop reason: HOSPADM

## 2020-02-11 RX ORDER — CEFAZOLIN SODIUM 2 G/100ML
2 INJECTION, SOLUTION INTRAVENOUS EVERY 8 HOURS
Status: COMPLETED | OUTPATIENT
Start: 2020-02-12 | End: 2020-02-12

## 2020-02-11 RX ORDER — HYDRALAZINE HYDROCHLORIDE 20 MG/ML
5 INJECTION INTRAMUSCULAR; INTRAVENOUS
Status: DISCONTINUED | OUTPATIENT
Start: 2020-02-11 | End: 2020-02-11 | Stop reason: HOSPADM

## 2020-02-11 RX ORDER — ROCURONIUM BROMIDE 10 MG/ML
INJECTION, SOLUTION INTRAVENOUS AS NEEDED
Status: DISCONTINUED | OUTPATIENT
Start: 2020-02-11 | End: 2020-02-11 | Stop reason: SURG

## 2020-02-11 RX ORDER — MAGNESIUM HYDROXIDE 1200 MG/15ML
LIQUID ORAL AS NEEDED
Status: DISCONTINUED | OUTPATIENT
Start: 2020-02-11 | End: 2020-02-11 | Stop reason: HOSPADM

## 2020-02-11 RX ORDER — ASPIRIN 325 MG
325 TABLET ORAL DAILY
Status: DISCONTINUED | OUTPATIENT
Start: 2020-02-12 | End: 2020-02-14 | Stop reason: HOSPADM

## 2020-02-11 RX ORDER — LABETALOL HYDROCHLORIDE 5 MG/ML
5 INJECTION, SOLUTION INTRAVENOUS
Status: DISCONTINUED | OUTPATIENT
Start: 2020-02-11 | End: 2020-02-11 | Stop reason: HOSPADM

## 2020-02-11 RX ORDER — MIDAZOLAM HYDROCHLORIDE 1 MG/ML
1 INJECTION INTRAMUSCULAR; INTRAVENOUS
Status: DISCONTINUED | OUTPATIENT
Start: 2020-02-11 | End: 2020-02-11 | Stop reason: HOSPADM

## 2020-02-11 RX ORDER — SODIUM CHLORIDE 0.9 % (FLUSH) 0.9 %
3 SYRINGE (ML) INJECTION EVERY 12 HOURS SCHEDULED
Status: DISCONTINUED | OUTPATIENT
Start: 2020-02-11 | End: 2020-02-11 | Stop reason: HOSPADM

## 2020-02-11 RX ORDER — GLYCOPYRROLATE 0.2 MG/ML
INJECTION INTRAMUSCULAR; INTRAVENOUS AS NEEDED
Status: DISCONTINUED | OUTPATIENT
Start: 2020-02-11 | End: 2020-02-11 | Stop reason: SURG

## 2020-02-11 RX ORDER — SODIUM CHLORIDE 0.9 % (FLUSH) 0.9 %
3-10 SYRINGE (ML) INJECTION AS NEEDED
Status: DISCONTINUED | OUTPATIENT
Start: 2020-02-11 | End: 2020-02-11 | Stop reason: HOSPADM

## 2020-02-11 RX ORDER — MEPERIDINE HYDROCHLORIDE 25 MG/ML
12.5 INJECTION INTRAMUSCULAR; INTRAVENOUS; SUBCUTANEOUS
Status: DISCONTINUED | OUTPATIENT
Start: 2020-02-11 | End: 2020-02-11 | Stop reason: HOSPADM

## 2020-02-11 RX ORDER — HYDROCODONE BITARTRATE AND ACETAMINOPHEN 5; 325 MG/1; MG/1
1 TABLET ORAL EVERY 6 HOURS PRN
Status: DISCONTINUED | OUTPATIENT
Start: 2020-02-11 | End: 2020-02-12

## 2020-02-11 RX ORDER — FAMOTIDINE 10 MG/ML
20 INJECTION, SOLUTION INTRAVENOUS ONCE
Status: COMPLETED | OUTPATIENT
Start: 2020-02-11 | End: 2020-02-11

## 2020-02-11 RX ORDER — FENTANYL CITRATE 50 UG/ML
100 INJECTION, SOLUTION INTRAMUSCULAR; INTRAVENOUS ONCE
Status: COMPLETED | OUTPATIENT
Start: 2020-02-11 | End: 2020-02-11

## 2020-02-11 RX ORDER — SUCCINYLCHOLINE CHLORIDE 20 MG/ML
INJECTION INTRAMUSCULAR; INTRAVENOUS AS NEEDED
Status: DISCONTINUED | OUTPATIENT
Start: 2020-02-11 | End: 2020-02-11 | Stop reason: SURG

## 2020-02-11 RX ORDER — HYDROMORPHONE HCL 110MG/55ML
PATIENT CONTROLLED ANALGESIA SYRINGE INTRAVENOUS AS NEEDED
Status: DISCONTINUED | OUTPATIENT
Start: 2020-02-11 | End: 2020-02-11 | Stop reason: SURG

## 2020-02-11 RX ORDER — PROMETHAZINE HYDROCHLORIDE 25 MG/1
25 SUPPOSITORY RECTAL ONCE AS NEEDED
Status: COMPLETED | OUTPATIENT
Start: 2020-02-11 | End: 2020-02-11

## 2020-02-11 RX ADMIN — LABETALOL HYDROCHLORIDE 5 MG: 5 INJECTION, SOLUTION INTRAVENOUS at 18:22

## 2020-02-11 RX ADMIN — GLYCOPYRROLATE 0.3 MG: 0.2 INJECTION INTRAMUSCULAR; INTRAVENOUS at 17:58

## 2020-02-11 RX ADMIN — NEOSTIGMINE METHYLSULFATE 3 MG: 1 INJECTION INTRAMUSCULAR; INTRAVENOUS; SUBCUTANEOUS at 17:58

## 2020-02-11 RX ADMIN — HYDROMORPHONE HYDROCHLORIDE 0.5 MG: 2 INJECTION, SOLUTION INTRAMUSCULAR; INTRAVENOUS; SUBCUTANEOUS at 17:27

## 2020-02-11 RX ADMIN — SODIUM CHLORIDE, POTASSIUM CHLORIDE, SODIUM LACTATE AND CALCIUM CHLORIDE 9 ML/HR: 600; 310; 30; 20 INJECTION, SOLUTION INTRAVENOUS at 14:45

## 2020-02-11 RX ADMIN — ROCURONIUM BROMIDE 5 MG: 10 INJECTION, SOLUTION INTRAVENOUS at 17:06

## 2020-02-11 RX ADMIN — PROPOFOL 100 MG: 10 INJECTION, EMULSION INTRAVENOUS at 17:07

## 2020-02-11 RX ADMIN — SUCCINYLCHOLINE CHLORIDE 120 MG: 20 INJECTION, SOLUTION INTRAMUSCULAR; INTRAVENOUS; PARENTERAL at 17:07

## 2020-02-11 RX ADMIN — PHENYLEPHRINE HYDROCHLORIDE 100 MCG: 10 INJECTION INTRAVENOUS at 17:54

## 2020-02-11 RX ADMIN — ROCURONIUM BROMIDE 20 MG: 10 INJECTION, SOLUTION INTRAVENOUS at 17:11

## 2020-02-11 RX ADMIN — MORPHINE SULFATE 2 MG: 2 INJECTION, SOLUTION INTRAMUSCULAR; INTRAVENOUS at 03:50

## 2020-02-11 RX ADMIN — HYDROMORPHONE HYDROCHLORIDE 0.5 MG: 2 INJECTION, SOLUTION INTRAMUSCULAR; INTRAVENOUS; SUBCUTANEOUS at 17:32

## 2020-02-11 RX ADMIN — MORPHINE SULFATE 2 MG: 2 INJECTION, SOLUTION INTRAMUSCULAR; INTRAVENOUS at 01:07

## 2020-02-11 RX ADMIN — TRANEXAMIC ACID 1000 MG: 100 INJECTION, SOLUTION INTRAVENOUS at 17:55

## 2020-02-11 RX ADMIN — FENTANYL CITRATE 50 MCG: 50 INJECTION INTRAMUSCULAR; INTRAVENOUS at 17:57

## 2020-02-11 RX ADMIN — FENTANYL CITRATE 25 MCG: 50 INJECTION, SOLUTION INTRAMUSCULAR; INTRAVENOUS at 14:49

## 2020-02-11 RX ADMIN — FENTANYL CITRATE 50 MCG: 50 INJECTION, SOLUTION INTRAMUSCULAR; INTRAVENOUS at 19:45

## 2020-02-11 RX ADMIN — PHENYLEPHRINE HYDROCHLORIDE 100 MCG: 10 INJECTION INTRAVENOUS at 17:23

## 2020-02-11 RX ADMIN — IPRATROPIUM BROMIDE AND ALBUTEROL SULFATE 3 ML: 2.5; .5 SOLUTION RESPIRATORY (INHALATION) at 15:43

## 2020-02-11 RX ADMIN — MORPHINE SULFATE 2 MG: 2 INJECTION, SOLUTION INTRAMUSCULAR; INTRAVENOUS at 06:12

## 2020-02-11 RX ADMIN — FENTANYL CITRATE 50 MCG: 50 INJECTION, SOLUTION INTRAMUSCULAR; INTRAVENOUS at 18:45

## 2020-02-11 RX ADMIN — FENTANYL CITRATE 50 MCG: 50 INJECTION INTRAMUSCULAR; INTRAVENOUS at 17:37

## 2020-02-11 RX ADMIN — PROMETHAZINE HYDROCHLORIDE 6.25 MG: 25 INJECTION INTRAMUSCULAR; INTRAVENOUS at 18:40

## 2020-02-11 RX ADMIN — FENTANYL CITRATE 25 MCG: 50 INJECTION, SOLUTION INTRAMUSCULAR; INTRAVENOUS at 15:42

## 2020-02-11 RX ADMIN — FAMOTIDINE 20 MG: 10 INJECTION INTRAVENOUS at 14:43

## 2020-02-11 RX ADMIN — PHENYLEPHRINE HYDROCHLORIDE 100 MCG: 10 INJECTION INTRAVENOUS at 17:17

## 2020-02-11 RX ADMIN — CEFAZOLIN SODIUM 2 G: 2 INJECTION, SOLUTION INTRAVENOUS at 17:13

## 2020-02-11 RX ADMIN — TRANEXAMIC ACID 1000 MG: 100 INJECTION, SOLUTION INTRAVENOUS at 17:26

## 2020-02-11 RX ADMIN — ONDANSETRON HYDROCHLORIDE 4 MG: 2 SOLUTION INTRAMUSCULAR; INTRAVENOUS at 17:50

## 2020-02-11 RX ADMIN — MORPHINE SULFATE 2 MG: 2 INJECTION, SOLUTION INTRAMUSCULAR; INTRAVENOUS at 08:49

## 2020-02-11 NOTE — OP NOTE
Anterior Total Hip Operative Note  Dr. DANIELA Serrano II  [phone]    PATIENT NAME: Deborah Mcclelland  MRN: [id number]  : 1940 AGE: 79 y.o. GENDER: female  DATE OF OPERATION: 2020  PREOPERATIVE DIAGNOSIS: Femoral Neck Fracture  POSTOPERATIVE DIAGNOSIS: Same  OPERATION PERFORMED: Right Anterior Total Hip Arthroplasty  SURGEON: Obed Serrano MD  Circulator: Anna Degroot RN; Yohana Hahn RN  : Gil Lowry  Radiology Technologist: Musa Weiss  [place] Person: Michael Norman; Esperanza Mejia  Vendor Representative: Magnus Tavarez  Assistant: Elena Hilario PA  ANESTHESIA: General  ASSISTANT: Elena Hilario. This case would not have been possible without another set of skilled surgical hands for retraction, use of instrumentation, and general assistance.  This assistance was vital to the success of the case.   ESTIMATED BLOOD LOSS: 200cc  SPONGE AND NEEDLE COUNT: Correct  INDICATIONS:  Fracture: This patient was noted to have a femoral neck fracture.  Surgical options were discussed with the patient and they elected to undergo a total hip replacement. A discussion of operative versus nonoperative treatment was had. They elected to undergo anterior total hip arthroplasty. The risks of surgery were discussed and included the risk of anesthesia, infection, damage to neurovascular structures, implant loosening/failure, fracture, hardware prominence, dislocation, the need for further procedures, medical complications, and others. No guarantees were made. The patient wished to proceed with surgery and a surgical consent was signed.  COMPONENTS:   · Acetabular Cup: Smith [employer]hew R3 acetabular cup: 52 Outer Diameter  · Cup Screws: Smith [employer]hew 6.5 mm screws: 25mm and 35mmmm length  · [employer] Nephew Neutral Acetabular Liner: Neutral  · Smith & Nephew Polar Femoral Stem: Size 2  · Smith & Nephew Oxinium Head: 36mm +4    PERTINENT FINDINGS: Fracture: Fracture of the femoral  neck    DETAILS OF PROCEDURE:   The patient was met in the preoperative area. The site was marked. The consent and H&P were reviewed. The patient was then wheeled back to the operative suite underwent anesthesia. The [place] table boots were secured to the patients’ feet. The patient was moved onto the [place] table and secured in the supine position. The perineal post was inserted and the boots were secured into the leg holders. Surgical alcohol was used to thoroughly clean the operative area.     The hip and leg was then prepped in the normal sterile fashion, which included ChloraPrep, multiple layers of sterile drapes, and surgical space suits for the entire operative team. New outer gloves were used by all sterile surgical team members after final draping. The surgical incision was marked. A surgical timeout was performed in which administration of preoperative antibiotics, tranexamic acid (if not contraindicated), and the surgical site were confirmed.    A Modified Horton-Cintron anterior approach was used. Dissection was carried down to the fascia. The fascia was incised and the tensor fascia clary muscle was retracted laterally and the sartorius medially. The lateral femoral circumflex vessels were identified and cauterized using bovie. The rectus femoris was retracted medially. A capsulotomy was then performed. The capsule was tagged with Ethibond for later repair. Retractors were placed on either side of the femoral neck and dissection was further carried down so that the lesser trochanter could be palpated and the superior rim of the acetabulum could be visualized.    The femoral neck cut was then made from  just proximal to the lesser trochanter medially headed towards the saddle laterally. Care was taken not to extend the cut into the lesser or greater trochanters. The head and neck segment were removed with a corkscrew. The acetabulum was then exposed. The labrum was removed using a kocher and scalpel and  excess osteophytes were removed using an osteotome.    The acetabulum was progressively reamed, beginning with medialization and then finalizing the position of the reamer to approximate the final cup position. Fluoroscopy was used to ensure proper placement of the reamer, including adequate medialization as well as appropriate abduction and anteversion. The real cup was then opened and inserted using fluoroscopy, ensuring good position in terms of abduction and anteversion.     Screws: Although there was some initial press-fit, the bone seemed of poor quality and a decision was then made to supplement the initial cup press fit fixation with screws. Holes were drilled through the cup and measured with a depth gauge to ensure safe placement.    After thorough irrigation and ensuring that no soft tissue was entrapped within the cup, the real liner was snapped into place.    The hook was placed just distal to the greater trochanter. The femur was then externally rotated, extended and adducted under the well leg. Soft tissue releases were performed to gain exposure to the proximal femur. Capsule was released from the saddle and the lateral femur. Care was taken to preserve the short external rotator tendons. The capsule was also released along the medial femur. The hydraulic femoral lift was then used to better expose the femur. Further soft tissue releases were then performed, again ensuring preservation of the short external rotators.    The femur was machined with a cookie cutter osteotome and then a rasp was used to further lateralize the starting point. The sclerotic bone on the lateral shoulder of the femur was removed with a rongeour and curette as needed to protect the greater trochanter. Progressive broaches were inserted until adequate fill had been achieved. Using fluoroscopy, the femoral stem was visualized after trial reduction of the hip. The length and offset were compared to the non-operative hip. Trials  "of stem size and neck length were trialed until equal leg length and offset were obtained. Additionally hip stability was tested with internal and external rotation of the leg. The leg was stable with at least 90° of external rotation and there was no impingement at 60° of internal rotation. After implantation of the final stem and ball, the leg was once again brought into normal anatomic position and relocated. Final x-rays were taken with final implants noting good position of the stem and cup, and no visualized fracture.. The hip was stable upon reduction.    No Prophylactic Cable: Before final reduction of the hip, the proximal femur and femoral calcar was thoroughly visualized to ensure that there was no fracture/crack. The bone quality was thought to be sufficient enough that no prophylactic cable was needed for this case.    An analgesic cocktail was then injected about the hip as well as the surgical dissection area. The capsule was closed with Ethibond. The fascia was closed with 0 Vicryl. Subcutaneous tissue was closed with 2-0 Vicryl.  A running Monocryl suture was used subcuticularly. Finally, a YAQUELIN wound vac was applied to the surgical site.     The patient was moved from the [place] table to the [facility] where the boots were removed. The patient was taken to the recovery room in stable condition. There were no complications and the patient tolerated the procedure well.    R \"Benjamin\" Zach MANDUJANO MD  Orthopaedic Surgery  [facility]  [phone]              "

## 2020-02-11 NOTE — PROGRESS NOTES
Name: Deborah Mcclelland ADMIT: 2/10/2020   : 1940  PCP: Sean Ryder MD    MRN: [id number] LOS: 1 days   AGE/SEX: 79 y.o. female  ROOM: [facility]     Subjective   Subjective   no new complaints or events overnight. family present at bedside.        Review of Systems   Constitutional: Negative for chills and fever.   Respiratory: Negative for chest tightness and shortness of breath.    Cardiovascular: Negative for chest pain and palpitations.   Musculoskeletal: Positive for gait problem.        Objective   Objective   Vital Signs  Temp:  [97 °F (36.1 °C)-98.9 °F (37.2 °C)] 97 °F (36.1 °C)  Heart Rate:  [76-89] 82  Resp:  [16] 16  BP: (143-200)/() 145/78  SpO2:  [94 %-100 %] 98 %  on  Flow (L/min):  [2] 2;   Device (Oxygen Therapy): nasal cannula  Body mass index is 27.5 kg/m².  Physical Exam   Constitutional: She is oriented to person, place, and time. She appears well-developed and well-nourished. No distress.   Cardiovascular: Normal rate and regular rhythm.   Pulmonary/Chest: Effort normal. No respiratory distress.   Neurological: She is alert and oriented to person, place, and time.   Psychiatric: She has a normal mood and affect. Her behavior is normal.   Nursing note and vitals reviewed.      Results Review:       I reviewed the patient's new clinical results.  Results from last 7 days   Lab Units 20  0445 02/10/20  1247   WBC 10*3/mm3 4.87 6.57   HEMOGLOBIN g/dL 12.8 14.0   PLATELETS 10*3/mm3 155 176     Results from last 7 days   Lab Units 20  0445 02/10/20  1247   SODIUM mmol/L 132* 137   POTASSIUM mmol/L 4.0 3.6   CHLORIDE mmol/L 99 98   CO2 mmol/L 24.3 23.3   BUN mg/dL 8 11   CREATININE mg/dL 0.62 0.64   GLUCOSE mg/dL 130* 103*   Estimated Creatinine Clearance: 62.9 mL/min (by C-G formula based on SCr of 0.62 mg/dL).  Results from last 7 days   Lab Units 02/10/20  1247   ALBUMIN g/dL 4.50   BILIRUBIN mg/dL 0.6   ALK PHOS U/L 126*   AST (SGOT) U/L 21   ALT (SGPT) U/L 20          Results from last 7 days   Lab Units 02/11/20  0445 02/10/20  1247   CALCIUM mg/dL 8.6 9.7   ALBUMIN g/dL  --  4.50       No results found for: HGBA1C, POCGLU      ceFAZolin 2 g Intravenous On Call to OR   cetirizine 10 mg Oral Daily   senna-docusate sodium 2 tablet Oral BID   sodium chloride 10 mL Intravenous Q12H       lactated ringers 100 mL/hr Last Rate: 100 mL/hr (02/10/20 2303)   NPO Diet       Assessment/Plan     Active Hospital Problems    Diagnosis  POA   • **Closed right hip fracture, initial encounter (CMS/[facility]) [S72.001A]  Yes   • COPD (chronic obstructive pulmonary disease) (CMS/[facility]) [J44.9]  Yes   • Asthma [J45.909]  Yes   • Fall [W19.XXXA]  Unknown      Resolved Hospital Problems   No resolved problems to display.     Right femur fracture   - NPO now  - appreciate ortho, plan is for surgery later today  - pain control  - nonweightbearing R leg  - PT/OT when appropriate  - CCP for placement at DC     COPD  - stable      · I discussed the patient's findings and my recommendations with patient, family and nursing staff.     VTE Prophylaxis - SCDs.  Code Status - Full code.    Anticipate discharge TBKAVON Khan  [facility]  02/11/20  9:45 AM

## 2020-02-11 NOTE — PLAN OF CARE
Problem: Patient Care Overview  Goal: Plan of Care Review  Outcome: Ongoing (interventions implemented as appropriate)  Flowsheets (Taken 2/11/2020 0049)  Progress: no change  Plan of Care Reviewed With: patient  Outcome Summary: patient appears comfortable between care but continues to call out for pain medication q1h, NPO for surgery 02/11/20, q2h turns, accumax and waffle boots in place, educated on reporting s/s asthma

## 2020-02-11 NOTE — CONSULTS
"  Orthopaedic Surgery  Hip Fracture Consult Note  Dr. DANIELA Birch” Zach II  [phone]    HPI:  Patient is a 79 y.o. Not  or  female who presents with hip pain after a fall from standing.  They presented to the ER for further workup where a right Femoral Neck Fracture was found. I was consulted for further management.  This patient does have a history of a left femoral neck fracture treated with arthroplasty by my father.  The patient also reports that he has performed knee replacements on both of her knees.    MEDICAL HISTORY  Past Medical History:   Diagnosis Date   • Asthma    • COPD (chronic obstructive pulmonary disease) (CMS/[facility])    ·   Past Surgical History:   Procedure Laterality Date   • HIP SURGERY Left    • KNEE SURGERY Bilateral    ·   Prior to Admission medications    Medication Sig Start Date End Date Taking? Authorizing Provider   cetirizine (zyrTEC) 10 MG tablet Take 10 mg by mouth Daily.   Yes Placido Cabrera MD   ipratropium-albuterol (COMBIVENT RESPIMAT)  MCG/ACT inhaler Inhale 1 puff 4 (Four) Times a Day As Needed for Wheezing.   Yes Placido Cabrera MD   Unable to find 1 each 1 (One) Time. Med Name: Breathing med starts with letter M   Yes Placido Cabrera MD   ·   · No Known Allergies  ·   · There is no immunization history on file for this patient.  Social History     Tobacco Use   • Smoking status: Never Smoker   Substance Use Topics   • Alcohol use: Never     Frequency: Never   ·    Social History     Substance and Sexual Activity   Drug Use Never   ·     VITALS: /80 (BP Location: Left arm, Patient Position: Lying)   Pulse 84   Temp 98.9 °F (37.2 °C) (Oral)   Resp 16   Ht 170.2 cm (67.01\")   Wt 79.7 kg (175 lb 9.6 oz)   SpO2 98%   BMI 27.50 kg/m²  Body mass index is 27.5 kg/m².    PHYSICAL EXAM:   · CONSTITUTIONAL: No acute distress  · LUNGS: Equal chest rise, no shortness of air  · CARDIOVASCULAR: palpable peripheral pulses  · SKIN: no skin " lesions in the area examined  · LYMPH: no lymphadenopathy in the area examined  · Right Lower Extremity  · Tenderness to Palpation: Tenderness to palpation at the hip  · Pulses:  Palpable DP/PT pulses  · Sensation: Sensation intact to light touch to saphenous/sural/deep peroneal/superficial peroneal/tibial nerves  · Motor: 5 out of 5 EHL/FHL/TA/GS motor complexes  · Range of Motion: Range of motion of hip deferred secondary to pain.  Positive pain with passive leg roll    RADIOLOGY REVIEW:   Xr Chest 1 View    Result Date: 2/10/2020  No focal pulmonary consolidation.  This report was finalized on 2/10/2020 12:33 PM by Dr. Kendall Sanford M.D.      Xr Hip With Or Without Pelvis 2 - 3 View Right    Result Date: 2/10/2020   Right femoral neck fracture.  This report was finalized on 2/10/2020 12:27 PM by Dr. Kendall Sanford M.D.        LABS:   Recent Results (from the past 24 hour(s))   Comprehensive Metabolic Panel    Collection Time: 02/10/20 12:47 PM   Result Value Ref Range    Glucose 103 (H) 65 - 99 mg/dL    BUN 11 8 - 23 mg/dL    Creatinine 0.64 0.57 - 1.00 mg/dL    Sodium 137 136 - 145 mmol/L    Potassium 3.6 3.5 - 5.2 mmol/L    Chloride 98 98 - 107 mmol/L    CO2 23.3 22.0 - 29.0 mmol/L    Calcium 9.7 8.6 - 10.5 mg/dL    Total Protein 7.1 6.0 - 8.5 g/dL    Albumin 4.50 3.50 - 5.20 g/dL    ALT (SGPT) 20 1 - 33 U/L    AST (SGOT) 21 1 - 32 U/L    Alkaline Phosphatase 126 (H) 39 - 117 U/L    Total Bilirubin 0.6 0.2 - 1.2 mg/dL    eGFR Non African Amer 90 >60 mL/min/1.73    Globulin 2.6 gm/dL    A/G Ratio 1.7 g/dL    BUN/Creatinine Ratio 17.2 7.0 - 25.0    Anion Gap 15.7 (H) 5.0 - 15.0 mmol/L   Type & Screen    Collection Time: 02/10/20 12:47 PM   Result Value Ref Range    ABO Type A     RH type Positive     Antibody Screen Negative     T&S Expiration Date 2/13/2020 11:59:59 PM    Protime-INR    Collection Time: 02/10/20 12:47 PM   Result Value Ref Range    Protime 12.1 11.7 - 14.2 Seconds    INR 0.92 0.90 -  1.10   CBC Auto Differential    Collection Time: 02/10/20 12:47 PM   Result Value Ref Range    WBC 6.57 3.40 - 10.80 10*3/mm3    RBC 4.74 3.77 - 5.28 10*6/mm3    Hemoglobin 14.0 12.0 - 15.9 g/dL    Hematocrit 41.5 34.0 - 46.6 %    MCV 87.6 79.0 - 97.0 fL    MCH 29.5 26.6 - 33.0 pg    MCHC 33.7 31.5 - 35.7 g/dL    RDW 13.6 12.3 - 15.4 %    RDW-SD 43.3 37.0 - 54.0 fl    MPV 9.6 6.0 - 12.0 fL    Platelets 176 140 - 450 10*3/mm3    Neutrophil % 84.4 (H) 42.7 - 76.0 %    Lymphocyte % 10.0 (L) 19.6 - 45.3 %    Monocyte % 4.6 (L) 5.0 - 12.0 %    Eosinophil % 0.0 (L) 0.3 - 6.2 %    Basophil % 0.5 0.0 - 1.5 %    Immature Grans % 0.5 0.0 - 0.5 %    Neutrophils, Absolute 5.55 1.70 - 7.00 10*3/mm3    Lymphocytes, Absolute 0.66 (L) 0.70 - 3.10 10*3/mm3    Monocytes, Absolute 0.30 0.10 - 0.90 10*3/mm3    Eosinophils, Absolute 0.00 0.00 - 0.40 10*3/mm3    Basophils, Absolute 0.03 0.00 - 0.20 10*3/mm3    Immature Grans, Absolute 0.03 0.00 - 0.05 10*3/mm3    nRBC 0.0 0.0 - 0.2 /100 WBC   Basic Metabolic Panel    Collection Time: 02/11/20  4:45 AM   Result Value Ref Range    Glucose 130 (H) 65 - 99 mg/dL    BUN 8 8 - 23 mg/dL    Creatinine 0.62 0.57 - 1.00 mg/dL    Sodium 132 (L) 136 - 145 mmol/L    Potassium 4.0 3.5 - 5.2 mmol/L    Chloride 99 98 - 107 mmol/L    CO2 24.3 22.0 - 29.0 mmol/L    Calcium 8.6 8.6 - 10.5 mg/dL    eGFR Non African Amer 93 >60 mL/min/1.73    BUN/Creatinine Ratio 12.9 7.0 - 25.0    Anion Gap 8.7 5.0 - 15.0 mmol/L   CBC Auto Differential    Collection Time: 02/11/20  4:45 AM   Result Value Ref Range    WBC 4.87 3.40 - 10.80 10*3/mm3    RBC 4.35 3.77 - 5.28 10*6/mm3    Hemoglobin 12.8 12.0 - 15.9 g/dL    Hematocrit 37.7 34.0 - 46.6 %    MCV 86.7 79.0 - 97.0 fL    MCH 29.4 26.6 - 33.0 pg    MCHC 34.0 31.5 - 35.7 g/dL    RDW 13.4 12.3 - 15.4 %    RDW-SD 42.4 37.0 - 54.0 fl    MPV 9.4 6.0 - 12.0 fL    Platelets 155 140 - 450 10*3/mm3    Neutrophil % 77.3 (H) 42.7 - 76.0 %    Lymphocyte % 12.7 (L) 19.6 - 45.3 %     Monocyte % 7.6 5.0 - 12.0 %    Eosinophil % 1.8 0.3 - 6.2 %    Basophil % 0.4 0.0 - 1.5 %    Immature Grans % 0.2 0.0 - 0.5 %    Neutrophils, Absolute 3.76 1.70 - 7.00 10*3/mm3    Lymphocytes, Absolute 0.62 (L) 0.70 - 3.10 10*3/mm3    Monocytes, Absolute 0.37 0.10 - 0.90 10*3/mm3    Eosinophils, Absolute 0.09 0.00 - 0.40 10*3/mm3    Basophils, Absolute 0.02 0.00 - 0.20 10*3/mm3    Immature Grans, Absolute 0.01 0.00 - 0.05 10*3/mm3    nRBC 0.0 0.0 - 0.2 /100 WBC       IMPRESSION:  Patient is a 79 y.o. Not  or  female with right Femoral Neck Fracture    PLAN:   · Admited to: Ruben Hardy MD  · Diet: NPO Now  · Weight Bearing:Right Lower Extremity Non Weight Bearing until after surgery  · Labs: None additional needed  · Imaging: None additional needed  · Surgery: Total hip arthroplasty for femoral neck fracture  · SHIRA Consent: The risks and benefits of operative versus nonoperative treatment were discussed. The patient elected to undergo operative treatment of their injury. The general risks discussed included but were not limited to the risk of anesthesia, medical complications, infection, the need for further procedures, damage to neurovascular structures, blood clots, and continued pain.  No guarantees were made. Specifically, for femoral neck fractures I had a thorough discussion with the patient about the operative options of femoral neck fractures. These options included closed reduction and percutaneous pinning (CRPP), hemiarthroplasty, and total hip arthroplasty. The risks and benefits of each were thoroughly discussed. The major risks specific to CRPP included but were not limited to nonunion/osteonecrosis, implant failure and postoperative weightbearing restrictions. The major risks specific to hemiarthroplasty included but were not limited to continued groin pain, infection, dislocation, and posterior precautions. The major risks specific to total hip arthroplasty included but were not  "limited to femoral fracture, increased dislocation risk, increased infection risk. The major benefits of CRPP included the minimally invasive nature of an short duration of the surgery. The major benefit of hemiarthroplasty was the ellimination of osteonecrosis as a risk and quicker mobilization due to immediate weightbearing. The major benefit of total hip arthroplasty was the decreased groin pain from implantation of the acetabular component, faster mobilization, and the lack hip precautions due to the anterior approach. These options were thoroughly discussed as outlined above and the patient wished to proceed with surgery. The surgical consent was signed.   · Disposition: Surgery later today for right hip.  This will either be performed by myself or my father Dr. Obed Serrano, Sr. depending on OR availability and time of day.    R \"Benjamin\"Zach MANDUJANO MD  Orthopaedic Surgery  [facility]  [phone] - [place] Office  [phone] - [place] Office          "

## 2020-02-12 PROBLEM — S72.011A CLOSED SUBCAPITAL FRACTURE OF RIGHT FEMUR (HCC): Status: ACTIVE | Noted: 2020-02-10

## 2020-02-12 PROBLEM — D62 POSTOPERATIVE ANEMIA DUE TO ACUTE BLOOD LOSS: Status: ACTIVE | Noted: 2020-02-12

## 2020-02-12 PROBLEM — E87.1 HYPONATREMIA: Status: ACTIVE | Noted: 2020-02-12

## 2020-02-12 PROBLEM — M80.00XA OSTEOPOROSIS WITH PATHOLOGICAL FRACTURE: Status: ACTIVE | Noted: 2020-02-12

## 2020-02-12 LAB
ANION GAP SERPL CALCULATED.3IONS-SCNC: 13.4 MMOL/L (ref 5–15)
BASOPHILS # BLD AUTO: 0.03 10*3/MM3 (ref 0–0.2)
BASOPHILS NFR BLD AUTO: 0.4 % (ref 0–1.5)
BUN BLD-MCNC: 8 MG/DL (ref 8–23)
BUN/CREAT SERPL: 12.1 (ref 7–25)
CALCIUM SPEC-SCNC: 8.6 MG/DL (ref 8.6–10.5)
CHLORIDE SERPL-SCNC: 95 MMOL/L (ref 98–107)
CO2 SERPL-SCNC: 22.6 MMOL/L (ref 22–29)
CREAT BLD-MCNC: 0.66 MG/DL (ref 0.57–1)
DEPRECATED RDW RBC AUTO: 43.5 FL (ref 37–54)
EOSINOPHIL # BLD AUTO: 0 10*3/MM3 (ref 0–0.4)
EOSINOPHIL NFR BLD AUTO: 0 % (ref 0.3–6.2)
ERYTHROCYTE [DISTWIDTH] IN BLOOD BY AUTOMATED COUNT: 13.2 % (ref 12.3–15.4)
GFR SERPL CREATININE-BSD FRML MDRD: 87 ML/MIN/1.73
GLUCOSE BLD-MCNC: 112 MG/DL (ref 65–99)
HCT VFR BLD AUTO: 32.2 % (ref 34–46.6)
HGB BLD-MCNC: 10.9 G/DL (ref 12–15.9)
IMM GRANULOCYTES # BLD AUTO: 0.02 10*3/MM3 (ref 0–0.05)
IMM GRANULOCYTES NFR BLD AUTO: 0.3 % (ref 0–0.5)
LYMPHOCYTES # BLD AUTO: 0.44 10*3/MM3 (ref 0.7–3.1)
LYMPHOCYTES NFR BLD AUTO: 6 % (ref 19.6–45.3)
MCH RBC QN AUTO: 30.2 PG (ref 26.6–33)
MCHC RBC AUTO-ENTMCNC: 33.9 G/DL (ref 31.5–35.7)
MCV RBC AUTO: 89.2 FL (ref 79–97)
MONOCYTES # BLD AUTO: 0.59 10*3/MM3 (ref 0.1–0.9)
MONOCYTES NFR BLD AUTO: 8 % (ref 5–12)
NEUTROPHILS # BLD AUTO: 6.28 10*3/MM3 (ref 1.7–7)
NEUTROPHILS NFR BLD AUTO: 85.3 % (ref 42.7–76)
NRBC BLD AUTO-RTO: 0 /100 WBC (ref 0–0.2)
PLATELET # BLD AUTO: 141 10*3/MM3 (ref 140–450)
PMV BLD AUTO: 10 FL (ref 6–12)
POTASSIUM BLD-SCNC: 4 MMOL/L (ref 3.5–5.2)
RBC # BLD AUTO: 3.61 10*6/MM3 (ref 3.77–5.28)
SODIUM BLD-SCNC: 131 MMOL/L (ref 136–145)
WBC NRBC COR # BLD: 7.36 10*3/MM3 (ref 3.4–10.8)

## 2020-02-12 PROCEDURE — 25010000002 CEFAZOLIN PER 500 MG: Performed by: ORTHOPAEDIC SURGERY

## 2020-02-12 PROCEDURE — 97110 THERAPEUTIC EXERCISES: CPT

## 2020-02-12 PROCEDURE — 97161 PT EVAL LOW COMPLEX 20 MIN: CPT

## 2020-02-12 PROCEDURE — 80048 BASIC METABOLIC PNL TOTAL CA: CPT | Performed by: ORTHOPAEDIC SURGERY

## 2020-02-12 PROCEDURE — 85025 COMPLETE CBC W/AUTO DIFF WBC: CPT | Performed by: ORTHOPAEDIC SURGERY

## 2020-02-12 RX ORDER — HYDROCODONE BITARTRATE AND ACETAMINOPHEN 5; 325 MG/1; MG/1
1 TABLET ORAL EVERY 4 HOURS PRN
Status: DISCONTINUED | OUTPATIENT
Start: 2020-02-12 | End: 2020-02-14 | Stop reason: HOSPADM

## 2020-02-12 RX ADMIN — SENNOSIDES AND DOCUSATE SODIUM 2 TABLET: 8.6; 5 TABLET ORAL at 20:56

## 2020-02-12 RX ADMIN — HYDROCODONE BITARTRATE AND ACETAMINOPHEN 1 TABLET: 5; 325 TABLET ORAL at 23:14

## 2020-02-12 RX ADMIN — SODIUM CHLORIDE, PRESERVATIVE FREE 10 ML: 5 INJECTION INTRAVENOUS at 07:43

## 2020-02-12 RX ADMIN — HYDROCODONE BITARTRATE AND ACETAMINOPHEN 1 TABLET: 5; 325 TABLET ORAL at 18:52

## 2020-02-12 RX ADMIN — CEFAZOLIN SODIUM 2 G: 10 INJECTION, POWDER, FOR SOLUTION INTRAVENOUS at 07:42

## 2020-02-12 RX ADMIN — CETIRIZINE HYDROCHLORIDE 10 MG: 10 TABLET, FILM COATED ORAL at 21:55

## 2020-02-12 RX ADMIN — SENNOSIDES AND DOCUSATE SODIUM 2 TABLET: 8.6; 5 TABLET ORAL at 07:42

## 2020-02-12 RX ADMIN — HYDROCODONE BITARTRATE AND ACETAMINOPHEN 1 TABLET: 5; 325 TABLET ORAL at 12:49

## 2020-02-12 RX ADMIN — ASPIRIN 325 MG: 325 TABLET ORAL at 07:42

## 2020-02-12 RX ADMIN — SODIUM CHLORIDE, PRESERVATIVE FREE 10 ML: 5 INJECTION INTRAVENOUS at 21:03

## 2020-02-12 RX ADMIN — ACETAMINOPHEN 650 MG: 325 TABLET, FILM COATED ORAL at 01:25

## 2020-02-12 RX ADMIN — CEFAZOLIN SODIUM 2 G: 10 INJECTION, POWDER, FOR SOLUTION INTRAVENOUS at 16:03

## 2020-02-12 RX ADMIN — POLYETHYLENE GLYCOL 3350 17 G: 17 POWDER, FOR SOLUTION ORAL at 20:56

## 2020-02-12 RX ADMIN — CEFAZOLIN SODIUM 2 G: 10 INJECTION, POWDER, FOR SOLUTION INTRAVENOUS at 01:26

## 2020-02-12 NOTE — PLAN OF CARE
Problem: Patient Care Overview  Goal: Plan of Care Review  Flowsheets (Taken 2/12/2020 1050)  Outcome Summary: Pt is a 78 yo female who presented with R femoral neck fracture, now s/p R ant SHIRA. Upon exam, pt demonstrates post op pain, impaired R hip ROM, R LE weakness, impaired gait mechanics, and decreased endurance limiting independence with functional mobility. Pt was independent with mobility prior to admission. Currently requiring mod A for bed mobility and transfers and CGA with walker for ambulation. Pt would benefit from continued PT to address impairments, improve safety, and increase independence with functional mobility. Will plan to progress ambulation and ther ex as tolerated.

## 2020-02-12 NOTE — PROGRESS NOTES
Continued Stay Note  [facility]     Patient Name: Deborah Mcclelland  MRN: [id number]  Today's Date: 2/12/2020    Admit Date: 2/10/2020    Discharge Plan     Row Name 02/12/20 1707       Plan    Plan  SNF     Provided post acute provider list?  Yes    Post Acute Provider List  Home Health;Inpatient Rehab    Delivered To  Patient    Method of Delivery  In person    Plan Comments  F/u w/ pt at the bedside, she would like a referral to [facility] for short-term rehab. Ideally she would like to dc home w/ [facility], but she lives alone. Her daughter is looking into in-home caregiver agencies. Epic referrals sent to [facility] and [facility]; CCP will follow up in AM.        Discharge Codes    No documentation.             Magalie Cooper RN

## 2020-02-12 NOTE — DISCHARGE PLACEMENT REQUEST
"Beto Justin (79 y.o. Female)     Date of Birth Social Security Number Address Home Phone MRN    1940  [address]   [facility] [address] [phone] [id number]    Hinduism Marital Status          None        Admission Date Admission Type Admitting Provider Attending Provider Department, Room/Bed    2/10/20 Emergency Ruben Hardy MD Ray, Jonathan, MD [address], P777/1    Discharge Date Discharge Disposition Discharge Destination                       Attending Provider:  Robert Lopez MD    Allergies:  No Known Allergies    Isolation:  None   Infection:  None   Code Status:  CPR    Ht:  170.2 cm (67.01\")   Wt:  79.7 kg (175 lb 9.6 oz)    Admission Cmt:  None   Principal Problem:  Closed right hip fracture, initial encounter (CMS/[facility]) [S72.001A]                 Active Insurance as of 2/10/2020     Primary Coverage     Payor Plan Insurance Group Employer/Plan Group    MEDICARE MEDICARE A & B      Payor Plan Address Payor Plan Phone Number Payor Plan Fax Number Effective Dates    [address] [phone]  8/1/2006 - None Entered    [facility] [id number]       Subscriber Name Subscriber Birth Date Member ID       BETO JUSTIN 1940 [id number]                 Emergency Contacts      (Rel.) Home Phone Work Phone Mobile Phone    CHAVEZ JUSTIN (Son) -- -- [phone]              "

## 2020-02-12 NOTE — DISCHARGE PLACEMENT REQUEST
"Beto Justin (79 y.o. Female)     Date of Birth Social Security Number Address Home Phone MRN    1940  [address]   [facility] [address] [phone] [id number]    Taoist Marital Status          None        Admission Date Admission Type Admitting Provider Attending Provider Department, Room/Bed    2/10/20 Emergency Ruben Hardy MD Ray, Jonathan, MD [address], P777/1    Discharge Date Discharge Disposition Discharge Destination                       Attending Provider:  Robert Lopez MD    Allergies:  No Known Allergies    Isolation:  None   Infection:  None   Code Status:  CPR    Ht:  170.2 cm (67.01\")   Wt:  79.7 kg (175 lb 9.6 oz)    Admission Cmt:  None   Principal Problem:  Closed right hip fracture, initial encounter (CMS/[facility], located within [facility]) [S72.001A]                 Active Insurance as of 2/10/2020     Primary Coverage     Payor Plan Insurance Group Employer/Plan Group    MEDICARE MEDICARE A & B      Payor Plan Address Payor Plan Phone Number Payor Plan Fax Number Effective Dates    [address] [phone]  8/1/2006 - None Entered    [facility] [id number]       Subscriber Name Subscriber Birth Date Member ID       BETO JUSTIN 1940 [id number]                 Emergency Contacts      (Rel.) Home Phone Work Phone Mobile Phone    CHAVEZ JUSTIN (Son) -- -- [phone]              "

## 2020-02-12 NOTE — SIGNIFICANT NOTE
"Spoke with pt who refused PM gym stating she \"just didn't feel up to it.\" Will attempt to see 2/13 if able.  "

## 2020-02-12 NOTE — PROGRESS NOTES
"    Name: Deborah Mcclelland ADMIT: 2/10/2020   : 1940  PCP: Sean Ryder MD    MRN: [id number] LOS: 2 days   AGE/SEX: 79 y.o. female  ROOM: [facility]     Subjective   Subjective   Voices no concerns.  Discussed treatment of hordeolum, right upper eye lid -- currently taking neosporin, poly B, & dexamethasone drops TID per Dr. Ryder -- recommend follow-up with him for treatment duration.      \"Feels better\" today & up with PT \"twice today\" without complications.     Review of Systems   Constitutional: Negative for chills and fever.   Gastrointestinal: Negative for abdominal pain, constipation, diarrhea, nausea and vomiting.   Genitourinary: Negative for difficulty urinating and dysuria.   Neurological: Negative for numbness.        Objective   Objective   Vital Signs  Temp:  [97.1 °F (36.2 °C)-99.6 °F (37.6 °C)] 99.3 °F (37.4 °C)  Heart Rate:  [70-96] 89  Resp:  [14-18] 16  BP: (117-190)/(49-99) 117/59  SpO2:  [92 %-99 %] 92 %  on  Flow (L/min):  [2-4] 2;   Device (Oxygen Therapy): room air  Body mass index is 27.5 kg/m².     Physical Exam   Constitutional: She is oriented to person, place, and time. No distress.   HENT:   Head: Normocephalic and atraumatic.   Eyes: Pupils are equal, round, and reactive to light. EOM are normal.   Right eye upper lid hordeolum     Neck: Normal range of motion. Neck supple.   Cardiovascular: Normal rate and normal heart sounds.   Pulmonary/Chest: Effort normal and breath sounds normal. No respiratory distress. She has no wheezes.   Abdominal: Soft. Bowel sounds are normal.   Musculoskeletal: She exhibits no edema or deformity.   Neurological: She is alert and oriented to person, place, and time. No cranial nerve deficit or sensory deficit.   Skin: Skin is warm and dry. She is not diaphoretic.   Psychiatric: She has a normal mood and affect. Her behavior is normal. Judgment and thought content normal.       Results Review:       I reviewed the patient's new clinical " results.  Results from last 7 days   Lab Units 02/12/20  0436 02/11/20  0445 02/10/20  1247   WBC 10*3/mm3 7.36 4.87 6.57   HEMOGLOBIN g/dL 10.9* 12.8 14.0   PLATELETS 10*3/mm3 141 155 176     Results from last 7 days   Lab Units 02/12/20  0436 02/11/20  0445 02/10/20  1247   SODIUM mmol/L 131* 132* 137   POTASSIUM mmol/L 4.0 4.0 3.6   CHLORIDE mmol/L 95* 99 98   CO2 mmol/L 22.6 24.3 23.3   BUN mg/dL 8 8 11   CREATININE mg/dL 0.66 0.62 0.64   GLUCOSE mg/dL 112* 130* 103*   Estimated Creatinine Clearance: 62.9 mL/min (by C-G formula based on SCr of 0.66 mg/dL).  Results from last 7 days   Lab Units 02/10/20  1247   ALBUMIN g/dL 4.50   BILIRUBIN mg/dL 0.6   ALK PHOS U/L 126*   AST (SGOT) U/L 21   ALT (SGPT) U/L 20     Results from last 7 days   Lab Units 02/12/20  0436 02/11/20  0445 02/10/20  1247   CALCIUM mg/dL 8.6 8.6 9.7   ALBUMIN g/dL  --   --  4.50       No results found for: HGBA1C, POCGLU      aspirin 325 mg Oral Daily   ceFAZolin 2 g Intravenous Q8H   cetirizine 10 mg Oral Daily   senna-docusate sodium 2 tablet Oral BID   sodium chloride 10 mL Intravenous Q12H       lactated ringers 100 mL/hr Last Rate: 100 mL/hr (02/10/20 2303)   lactated ringers 9 mL/hr Last Rate: 9 mL/hr (02/11/20 1445)   Diet Regular       Assessment/Plan     Active Hospital Problems    Diagnosis  POA   • **Closed right hip fracture, initial encounter (CMS/[facility]) [S72.001A]  Yes   • COPD (chronic obstructive pulmonary disease) (CMS/[facility]) [J44.9]  Yes   • Asthma [J45.909]  Yes   • Fall [W19.XXXA]  Unknown      Resolved Hospital Problems   No resolved problems to display.       79 y.o. female admitted with Closed right hip fracture, initial encounter (CMS/HCC).      Closed right hip fracture, initial encounter (CMS/HCC)   Defer to ortho surgery --> rec follow-up in 3 weeks postop   IS/ CDB   Weight bearing as tolerated       COPD (chronic obstructive pulmonary disease) (CMS/HCC)   Stable       Asthma   Cetrizine, pulse oximetry      Fall   PT  following to mobilize    · ASA for DVT prophylaxis.  · CPR  · Discussed with Dr. Lopez  · Anticipate discharge TBD, patient requesting home with home health at discharge.  Will consider PT recommendations for rehab.        KAVON Katz  [facility]  02/12/20  1:52 PM

## 2020-02-12 NOTE — PROGRESS NOTES
Orthopaedic Surgery   Daily Progress Note  Dr. DANIELA Serrano II  [phone]  DEMOGRAPHICS:   · Deborah Mcclelland   · Age:79 y.o.   · MRN:[id number]  · Admitted: 2/10/2020    PROCEDURE: 1 Day Post-Op s/p Procedure(s):  RIGHT TOTAL HIP ARTHROPLASTY ANTERIOR WITH HANA TABLE     HOSPITAL PROGRESS  · Patient Issues: No complaints of pain, overall doing very well.  She has been fairly mobile since surgery  · Ambulation/Activity: Ambulating well with PT/Nursing.      VITALS:  Vitals:    02/11/20 2013 02/11/20 2201 02/12/20 0313 02/12/20 0700   BP: 127/61 139/69 123/65 122/49   BP Location: Left arm Left arm Left arm Right arm   Patient Position: Lying Lying Lying Sitting   Pulse: 76 75 77 80   Resp: 16 16 16 16   Temp: 99.4 °F (37.4 °C) 97.6 °F (36.4 °C) 97.1 °F (36.2 °C)    TempSrc: Oral Oral Oral    SpO2: 94% 98% 99% 94%   Weight:       Height:           PHYSICAL EXAM:  · LUNGS: Equal chest rise, no shortness of air  · CARDIOVASCULAR: brisk capillary refill intact  · WOUND: YAQUELIN Wound Vac System working in good condition, minimal drainage  · EXTREMITY: Operative Leg  · Pulses: brisk capillary refill intact  · Sensation: Sensation intact to light touch to the saphenous/sural/tibial/deep peroneal/superficial peroneal nerves, and grossly throughout the extremity.  · Motor: 5/5 EHL/FHL/TA/GS motor complexes    LABS:   Lab Results   Component Value Date    HGB 10.9 (L) 02/12/2020     Lab Results   Component Value Date    WBC 7.36 02/12/2020     Lab Results   Component Value Date    GLUCOSE 112 (H) 02/12/2020    CALCIUM 8.6 02/12/2020     (L) 02/12/2020    K 4.0 02/12/2020    CO2 22.6 02/12/2020    CL 95 (L) 02/12/2020    BUN 8 02/12/2020    CREATININE 0.66 02/12/2020    EGFRIFNONA 87 02/12/2020    BCR 12.1 02/12/2020    ANIONGAP 13.4 02/12/2020       ASSESSMENT: Patient is a 79 y.o. female who is 1 Day Post-Op s/p Procedure(s):  RIGHT TOTAL HIP ARTHROPLASTY ANTERIOR WITH HANA TABLE     PLAN:   · Weight Bearing:  "Weight Bearing As Tolerated  · Labs: Above lab values review. Plan: No intervention necessary at this time.   · PT/OT: To Mobilize  · DVT PPX: ASA 325mg Daily for 30 days  · Post-Op Xray: SHIRA implants in good position.   · Follow-Up: In office at 3 weeks postop  · Dispo: Patient stating she would like to return home with family and home health.  I am okay with this if she progresses well with therapy, if not she will require inpatient rehab in a few days    R \"Benjamin\" Zach MANDUJANO MD  Orthopaedic Surgery  [facility]  [phone] - [place] Office  [phone] - [place] Office      "

## 2020-02-12 NOTE — PLAN OF CARE
Problem: Patient Care Overview  Goal: Plan of Care Review  Outcome: Ongoing (interventions implemented as appropriate)  Flowsheets (Taken 2/12/2020 0305)  Progress: improving  Plan of Care Reviewed With: patient  Outcome Summary: patient drowsy through first half of shift, more alert at this time, ambulating with assistance to bathroom, tylenol given for pain r/t drowsiness, educated on s/s of asthma

## 2020-02-12 NOTE — PLAN OF CARE
Problem: Patient Care Overview  Goal: Plan of Care Review  Outcome: Ongoing (interventions implemented as appropriate)  Flowsheets  Taken 2/12/2020 0305 by Yaima Benjamin, RN  Progress: improving  Taken 2/12/2020 1050 by Usha Rosa PT  Plan of Care Reviewed With: patient  Taken 2/12/2020 1635 by Usha Costello, RN  Outcome Summary: Pain controlled with PO pain medication. Ambulates with assist x1 and walker. VSS. Voiding without difficulty. DC home with home health when ready. Educated on o2 sat monitoring.

## 2020-02-12 NOTE — THERAPY EVALUATION
Patient Name: Deborah Mcclelland  : 1940    MRN: [id number]                              Today's Date: 2020       Admit Date: 2/10/2020    Visit Dx:     ICD-10-CM ICD-9-CM   1. Closed right hip fracture, initial encounter (CMS/[facility]) S72.001A 820.8   2. Essential hypertension I10 401.9   3. Chronic obstructive pulmonary disease, unspecified COPD type (CMS/[facility]) J44.9 496     Patient Active Problem List   Diagnosis   • Closed right hip fracture, initial encounter (CMS/[facility])   • COPD (chronic obstructive pulmonary disease) (CMS/[facility])   • Asthma   • Fall     Past Medical History:   Diagnosis Date   • Asthma    • COPD (chronic obstructive pulmonary disease) (CMS/[facility])      Past Surgical History:   Procedure Laterality Date   • HIP SURGERY Left    • KNEE SURGERY Bilateral    • TOTAL HIP ARTHROPLASTY Right 2020    Procedure: RIGHT TOTAL HIP ARTHROPLASTY ANTERIOR WITH HANA TABLE;  Surgeon: Obed Serrano II, MD;  Location: [facility]  Service: Orthopedics;  Laterality: Right;     General Information     Row Name 20 1048          PT Evaluation Time/Intention    Document Type  evaluation  -EE     Mode of Treatment  individual therapy;physical therapy  -EE     Row Name 20 1048          General Information    Patient Profile Reviewed?  yes  -EE     Prior Level of Function  independent:;all household mobility;community mobility  -EE     Existing Precautions/Restrictions  fall;right;hip, anterior  -EE     Barriers to Rehab  none identified  -EE     Row Name 20 1048          Relationship/Environment    Lives With  alone  -EE     Row Name 20 1048          Resource/Environmental Concerns    Current Living Arrangements  home/apartment/condo  -EE     Row Name 20 1048          Home Main Entrance    Number of Stairs, Main Entrance  four  -EE     Stair Railings, Main Entrance  railings safe and in good condition  -EE     Row Name 20 1048          Cognitive  Assessment/Intervention- PT/OT    Orientation Status (Cognition)  oriented x 4  -EE     Row Name 02/12/20 1048          Safety Issues, Functional Mobility    Impairments Affecting Function (Mobility)  pain;endurance/activity tolerance;strength;range of motion (ROM)  -EE       User Key  (r) = Recorded By, (t) = Taken By, (c) = Cosigned By    Initials Name Provider Type    EE Usha Rosa, PT Physical Therapist        Mobility     Row Name 02/12/20 1048          Bed Mobility Assessment/Treatment    Bed Mobility Assessment/Treatment  supine-sit  -EE     Supine-Sit [place] (Bed Mobility)  moderate assist (50% patient effort);verbal cues  -EE     Assistive Device (Bed Mobility)  bed rails;head of bed elevated  -EE     Row Name 02/12/20 1048          Transfer Assessment/Treatment    Comment (Transfers)  vc's required for hand placement during transfers; multiple attempts required before achieving full stand  -EE     Row Name 02/12/20 1048          Sit-Stand Transfer    Sit-Stand [place] (Transfers)  moderate assist (50% patient effort);verbal cues  -EE     Assistive Device (Sit-Stand Transfers)  walker, front-wheeled  -EE     Row Name 02/12/20 1048          Gait/Stairs Assessment/Training    [place] Level (Gait)  contact guard;verbal cues  -EE     Assistive Device (Gait)  walker, front-wheeled  -EE     Distance in Feet (Gait)  25  -EE     Pattern (Gait)  step-to  -EE     Deviations/Abnormal Patterns (Gait)  tyson decreased;right sided deviations;antalgic;stride length decreased  -EE     Bilateral Gait Deviations  forward flexed posture  -EE     Right Sided Gait Deviations  heel strike decreased;weight shift ability decreased  -EE     Comment (Gait/Stairs)  vc's required for gait sequencing with walker  -EE       User Key  (r) = Recorded By, (t) = Taken By, (c) = Cosigned By    Initials Name Provider Type    EE Usha Rosa, OSCAR Physical Therapist        Obj/Interventions     Row Name 02/12/20 1049           General ROM    GENERAL ROM COMMENTS  R hip ROM limited by pain; L LE AROM WFL  -EE     Row Name 02/12/20 1049          MMT (Manual Muscle Testing)    General MMT Comments  R hip abd 2-/5; R quad 3-/5;  L LE grossly 4/5  -EE     Row Name 02/12/20 1049          Therapeutic Exercise    Comment (Therapeutic Exercise)  x10 reps SHIRA protocol; assist required with heel slides and hip abd  -EE     Row Name 02/12/20 1049          Static Sitting Balance    Level of [place] (Unsupported Sitting, Static Balance)  supervision  -EE     Sitting Position (Unsupported Sitting, Static Balance)  sitting on edge of bed  -EE     Row Name 02/12/20 1049          Static Standing Balance    Level of [place] (Supported Standing, Static Balance)  contact guard assist  -EE     Assistive Device Utilized (Supported Standing, Static Balance)  walker, rolling  -EE       User Key  (r) = Recorded By, (t) = Taken By, (c) = Cosigned By    Initials Name Provider Type    EE Usha Rosa, PT Physical Therapist        Goals/Plan     Row Name 02/12/20 1053          Bed Mobility Goal 1 (PT)    Activity/Assistive Device (Bed Mobility Goal 1, PT)  bed mobility activities, all  -EE     [place] Level/Cues Needed (Bed Mobility Goal 1, PT)  standby assist  -EE     Time Frame (Bed Mobility Goal 1, PT)  5 days  -EE     Progress/Outcomes (Bed Mobility Goal 1, PT)  goal ongoing  -EE     Row Name 02/12/20 1053          Transfer Goal 1 (PT)    Activity/Assistive Device (Transfer Goal 1, PT)  sit-to-stand/stand-to-sit;walker, rolling  -EE     [place] Level/Cues Needed (Transfer Goal 1, PT)  contact guard assist  -EE     Time Frame (Transfer Goal 1, PT)  5 days  -EE     Progress/Outcome (Transfer Goal 1, PT)  goal ongoing  -EE     Row Name 02/12/20 1053          Gait Training Goal 1 (PT)    Activity/Assistive Device (Gait Training Goal 1, PT)  gait (walking locomotion);assistive device use  -EE     [place] Level (Gait Training Goal 1, PT)   contact guard assist  -EE     Distance (Gait Goal 1, PT)  100  -EE     Progress/Outcome (Gait Training Goal 1, PT)  goal ongoing  -EE     Row Name 02/12/20 1053          Patient Education Goal (PT)    Activity (Patient Education Goal, PT)  SHIRA HEP; ant SHIRA precautions  -EE     [place]/Cues/Accuracy (Memory Goal 2, PT)  demonstrates adequately;verbalizes understanding  -EE     Time Frame (Patient Education Goal, PT)  5 days  -EE     Progress/Outcome (Patient Education Goal, PT)  goal ongoing  -EE       User Key  (r) = Recorded By, (t) = Taken By, (c) = Cosigned By    Initials Name Provider Type    EE Usha Rosa, PT Physical Therapist        Clinical Impression     Row Name 02/12/20 1050          Pain Assessment    Additional Documentation  Pain Scale: Numbers Pre/Post-Treatment (Group)  -EE     Row Name 02/12/20 1050          Pain Scale: Numbers Pre/Post-Treatment    Pain Scale: Numbers, Pretreatment  4/10  -EE     Pain Scale: Numbers, Post-Treatment  5/10  -EE     Pain Location - Side  Right  -EE     Pain Location - Orientation  incisional  -EE     Pain Location  hip  -EE     Pain Intervention(s)  Repositioned;Rest;Elevated  -EE     Row Name 02/12/20 1050          Plan of Care Review    Plan of Care Reviewed With  patient  -EE     Outcome Summary  Pt is a 78 yo female who presented with R femoral neck fracture, now s/p R ant SHIRA. Upon exam, pt demonstrates post op pain, impaired R hip ROM, R LE weakness, impaired gait mechanics, and decreased endurance limiting independence with functional mobility. Pt was independent with mobility prior to admission. Currently requiring mod A for bed mobility and transfers and CGA with walker for ambulation. Pt would benefit from continued PT to address impairments, improve safety, and increase independence with functional mobility. Will plan to progress ambulation and ther ex as tolerated.   -EE     Row Name 02/12/20 1053          Physical Therapy Clinical Impression     Criteria for Skilled Interventions Met (PT Clinical Impression)  yes;treatment indicated  -EE     Rehab Potential (PT Clinical Summary)  good, to achieve stated therapy goals  -EE     Row Name 02/12/20 1050          Positioning and Restraints    Pre-Treatment Position  in bed  -EE     Post Treatment Position  chair  -EE     In Chair  reclined;call light within reach;encouraged to call for assist;exit alarm on;legs elevated  -EE       User Key  (r) = Recorded By, (t) = Taken By, (c) = Cosigned By    Initials Name Provider Type    Usha Rascon, OSCAR Physical Therapist        Outcome Measures     Row Name 02/12/20 1053          How much help from another person do you currently need...    Turning from your back to your side while in flat bed without using bedrails?  3  -EE     Moving from lying on back to sitting on the side of a flat bed without bedrails?  2  -EE     Moving to and from a bed to a chair (including a wheelchair)?  3  -EE     Standing up from a chair using your arms (e.g., wheelchair, bedside chair)?  2  -EE     Climbing 3-5 steps with a railing?  2  -EE     To walk in hospital room?  3  -EE     AM-PAC 6 Clicks Score (PT)  15  -EE     Row Name 02/12/20 1053          Functional Assessment    Outcome Measure Options  AM-PAC 6 Clicks Basic Mobility (PT)  -EE       User Key  (r) = Recorded By, (t) = Taken By, (c) = Cosigned By    Initials Name Provider Type    Usha Rascon, PT Physical Therapist          PT Recommendation and Plan  Planned Therapy Interventions (PT Eval): balance training, bed mobility training, gait training, home exercise program, patient/family education, ROM (range of motion), stair training, strengthening, stretching, transfer training  Outcome Summary/Treatment Plan (PT)  Anticipated Equipment Needs at Discharge (PT): front wheeled walker, bedside commode(if DC home)  Anticipated Discharge Disposition (PT): home with home health, home with assist, skilled nursing facility(pending  progress)  Plan of Care Reviewed With: patient  Outcome Summary: Pt is a 80 yo female who presented with R femoral neck fracture, now s/p R ant SHIRA. Upon exam, pt demonstrates post op pain, impaired R hip ROM, R LE weakness, impaired gait mechanics, and decreased endurance limiting independence with functional mobility. Pt was independent with mobility prior to admission. Currently requiring mod A for bed mobility and transfers and CGA with walker for ambulation. Pt would benefit from continued PT to address impairments, improve safety, and increase independence with functional mobility. Will plan to progress ambulation and ther ex as tolerated.      Time Calculation:   PT Charges     Row Name 02/12/20 1054             Time Calculation    Start Time  1027  -EE      Stop Time  1049  -EE      Time Calculation (min)  22 min  -EE      PT Received On  02/12/20  -EE      PT - Next Appointment  02/12/20  -EE      PT Goal Re-Cert Due Date  02/17/20  -EE         Time Calculation- PT    Total Timed Code Minutes- PT  11 minute(s)  -EE        User Key  (r) = Recorded By, (t) = Taken By, (c) = Cosigned By    Initials Name Provider Type    EE Usha Rosa, PT Physical Therapist        Therapy Charges for Today     Code Description Service Date Service Provider Modifiers Qty    76773239307 HC PT EVAL LOW COMPLEXITY 2 2/12/2020 Usha Rosa, PT GP 1    58029535440 HC PT THER PROC EA 15 MIN 2/12/2020 Usha Rosa, PT GP 1          PT G-Codes  Outcome Measure Options: AM-PAC 6 Clicks Basic Mobility (PT)  AM-PAC 6 Clicks Score (PT): 15    Usha Rosa PT  2/12/2020

## 2020-02-13 PROBLEM — E55.9 VITAMIN D DEFICIENCY: Status: ACTIVE | Noted: 2020-02-13

## 2020-02-13 LAB
25(OH)D3 SERPL-MCNC: 18.6 NG/ML (ref 30–100)
ANION GAP SERPL CALCULATED.3IONS-SCNC: 9.8 MMOL/L (ref 5–15)
BUN BLD-MCNC: 10 MG/DL (ref 8–23)
BUN/CREAT SERPL: 19.2 (ref 7–25)
CALCIUM SPEC-SCNC: 8.2 MG/DL (ref 8.6–10.5)
CHLORIDE SERPL-SCNC: 99 MMOL/L (ref 98–107)
CO2 SERPL-SCNC: 26.2 MMOL/L (ref 22–29)
CREAT BLD-MCNC: 0.52 MG/DL (ref 0.57–1)
DEPRECATED RDW RBC AUTO: 41.3 FL (ref 37–54)
ERYTHROCYTE [DISTWIDTH] IN BLOOD BY AUTOMATED COUNT: 13 % (ref 12.3–15.4)
GFR SERPL CREATININE-BSD FRML MDRD: 114 ML/MIN/1.73
GLUCOSE BLD-MCNC: 113 MG/DL (ref 65–99)
HCT VFR BLD AUTO: 29.1 % (ref 34–46.6)
HGB BLD-MCNC: 9.8 G/DL (ref 12–15.9)
MCH RBC QN AUTO: 29.2 PG (ref 26.6–33)
MCHC RBC AUTO-ENTMCNC: 33.7 G/DL (ref 31.5–35.7)
MCV RBC AUTO: 86.6 FL (ref 79–97)
PHOSPHATE SERPL-MCNC: 2.5 MG/DL (ref 2.5–4.5)
PLATELET # BLD AUTO: 135 10*3/MM3 (ref 140–450)
PMV BLD AUTO: 9.8 FL (ref 6–12)
POTASSIUM BLD-SCNC: 3.7 MMOL/L (ref 3.5–5.2)
RBC # BLD AUTO: 3.36 10*6/MM3 (ref 3.77–5.28)
SODIUM BLD-SCNC: 135 MMOL/L (ref 136–145)
WBC NRBC COR # BLD: 5.29 10*3/MM3 (ref 3.4–10.8)

## 2020-02-13 PROCEDURE — 84100 ASSAY OF PHOSPHORUS: CPT | Performed by: HOSPITALIST

## 2020-02-13 PROCEDURE — 82306 VITAMIN D 25 HYDROXY: CPT | Performed by: HOSPITALIST

## 2020-02-13 PROCEDURE — 97150 GROUP THERAPEUTIC PROCEDURES: CPT

## 2020-02-13 PROCEDURE — 85027 COMPLETE CBC AUTOMATED: CPT | Performed by: HOSPITALIST

## 2020-02-13 PROCEDURE — 80048 BASIC METABOLIC PNL TOTAL CA: CPT | Performed by: HOSPITALIST

## 2020-02-13 PROCEDURE — 97110 THERAPEUTIC EXERCISES: CPT

## 2020-02-13 RX ORDER — MELATONIN
1000 DAILY
Status: DISCONTINUED | OUTPATIENT
Start: 2020-02-13 | End: 2020-02-13

## 2020-02-13 RX ORDER — PANTOPRAZOLE SODIUM 40 MG/1
40 TABLET, DELAYED RELEASE ORAL
Status: DISCONTINUED | OUTPATIENT
Start: 2020-02-13 | End: 2020-02-14 | Stop reason: HOSPADM

## 2020-02-13 RX ORDER — ERGOCALCIFEROL 1.25 MG/1
50000 CAPSULE ORAL
Status: DISCONTINUED | OUTPATIENT
Start: 2020-02-13 | End: 2020-02-14 | Stop reason: HOSPADM

## 2020-02-13 RX ORDER — CALCIUM CARBONATE 200(500)MG
2 TABLET,CHEWABLE ORAL 3 TIMES DAILY PRN
Status: DISCONTINUED | OUTPATIENT
Start: 2020-02-13 | End: 2020-02-14 | Stop reason: HOSPADM

## 2020-02-13 RX ADMIN — HYDROCODONE BITARTRATE AND ACETAMINOPHEN 1 TABLET: 5; 325 TABLET ORAL at 23:58

## 2020-02-13 RX ADMIN — SENNOSIDES AND DOCUSATE SODIUM 2 TABLET: 8.6; 5 TABLET ORAL at 20:02

## 2020-02-13 RX ADMIN — POLYETHYLENE GLYCOL 3350 17 G: 17 POWDER, FOR SOLUTION ORAL at 09:18

## 2020-02-13 RX ADMIN — ERGOCALCIFEROL 50000 UNITS: 1.25 CAPSULE ORAL at 16:58

## 2020-02-13 RX ADMIN — HYDROCODONE BITARTRATE AND ACETAMINOPHEN 1 TABLET: 5; 325 TABLET ORAL at 04:01

## 2020-02-13 RX ADMIN — VITAMIN D, TAB 1000IU (100/BT) 1000 UNITS: 25 TAB at 15:08

## 2020-02-13 RX ADMIN — HYDROCODONE BITARTRATE AND ACETAMINOPHEN 1 TABLET: 5; 325 TABLET ORAL at 15:08

## 2020-02-13 RX ADMIN — PANTOPRAZOLE SODIUM 40 MG: 40 TABLET, DELAYED RELEASE ORAL at 16:58

## 2020-02-13 RX ADMIN — HYDROCODONE BITARTRATE AND ACETAMINOPHEN 1 TABLET: 5; 325 TABLET ORAL at 19:52

## 2020-02-13 RX ADMIN — CETIRIZINE HYDROCHLORIDE 10 MG: 10 TABLET, FILM COATED ORAL at 23:54

## 2020-02-13 RX ADMIN — HYDROCODONE BITARTRATE AND ACETAMINOPHEN 1 TABLET: 5; 325 TABLET ORAL at 09:17

## 2020-02-13 RX ADMIN — SODIUM CHLORIDE, PRESERVATIVE FREE 10 ML: 5 INJECTION INTRAVENOUS at 09:18

## 2020-02-13 RX ADMIN — SENNOSIDES AND DOCUSATE SODIUM 2 TABLET: 8.6; 5 TABLET ORAL at 09:18

## 2020-02-13 RX ADMIN — ASPIRIN 325 MG: 325 TABLET ORAL at 09:17

## 2020-02-13 NOTE — PLAN OF CARE
Problem: Patient Care Overview  Goal: Plan of Care Review  Outcome: Ongoing (interventions implemented as appropriate)  Flowsheets  Taken 2/13/2020 0238 by Merced Dave RN  Progress: improving  Taken 2/13/2020 0917 by Irina Garcia RN  Plan of Care Reviewed With: patient  Taken 2/13/2020 1409 by Irina Garcia RN  Outcome Summary: Pt POD 2 of RTH. VSS. YAQUELIN clean dry and intact. Voiding function intact. Ambulating with assist x1. Pain controlled at this time. NVI. Will continue to monitor.

## 2020-02-13 NOTE — PROGRESS NOTES
Name: Deborah Mcclelland ADMIT: 2/10/2020   : 1940  PCP: Sean Ryder MD    MRN: [id number] LOS: 3 days   AGE/SEX: 79 y.o. female  ROOM: [facility]     Subjective   Subjective   Voices no concerns & noted mild-moderate right hip pain s/p right total hip arthroplasty & adequate pain control noted with hydrocodone-acetaminophen 5-25 mg PO q4 hrs PRN moderate pain.     Review of Systems   Constitutional: Negative for chills and fever.   Respiratory: Negative for cough and shortness of breath.    Cardiovascular: Negative for chest pain and palpitations.   Gastrointestinal: Negative for nausea and vomiting.        Objective   Objective   Vital Signs  Temp:  [96.7 °F (35.9 °C)-100.8 °F (38.2 °C)] 99.7 °F (37.6 °C)  Heart Rate:  [80-97] 95  Resp:  [16] 16  BP: (110-143)/(57-67) 118/57  SpO2:  [90 %-95 %] 95 %  on   ;   Device (Oxygen Therapy): room air  Body mass index is 27.5 kg/m².     Physical Exam   Constitutional: She is oriented to person, place, and time. No distress.   HENT:   Head: Normocephalic and atraumatic.   Eyes: Pupils are equal, round, and reactive to light. EOM are normal.   Right upper lid hordeolum    Neck: Normal range of motion.   Cardiovascular: Normal rate and normal heart sounds.   Pulmonary/Chest: Effort normal and breath sounds normal. No respiratory distress. She has no wheezes.   Abdominal: Soft. Bowel sounds are normal.   Musculoskeletal: She exhibits no edema or deformity.   Limited ROM on RLE 2/2 post-op right total hip arthroplasty day 2 minimal to moderate pain --  Expected finding   Neurological: She is alert and oriented to person, place, and time.   Skin: Skin is warm and dry. She is not diaphoretic.   Psychiatric: She has a normal mood and affect. Her behavior is normal. Judgment and thought content normal.       Results Review:       I reviewed the patient's new clinical results.  Results from last 7 days   Lab Units 20  0456 20  0436 20  0445  02/10/20  1247   WBC 10*3/mm3 5.29 7.36 4.87 6.57   HEMOGLOBIN g/dL 9.8* 10.9* 12.8 14.0   PLATELETS 10*3/mm3 135* 141 155 176     Results from last 7 days   Lab Units 02/13/20  0456 02/12/20  0436 02/11/20  0445 02/10/20  1247   SODIUM mmol/L 135* 131* 132* 137   POTASSIUM mmol/L 3.7 4.0 4.0 3.6   CHLORIDE mmol/L 99 95* 99 98   CO2 mmol/L 26.2 22.6 24.3 23.3   BUN mg/dL 10 8 8 11   CREATININE mg/dL 0.52* 0.66 0.62 0.64   GLUCOSE mg/dL 113* 112* 130* 103*   Estimated Creatinine Clearance: 62.9 mL/min (A) (by C-G formula based on SCr of 0.52 mg/dL (L)).  Results from last 7 days   Lab Units 02/10/20  1247   ALBUMIN g/dL 4.50   BILIRUBIN mg/dL 0.6   ALK PHOS U/L 126*   AST (SGOT) U/L 21   ALT (SGPT) U/L 20     Results from last 7 days   Lab Units 02/13/20  0456 02/12/20 0436 02/11/20  0445 02/10/20  1247   CALCIUM mg/dL 8.2* 8.6 8.6 9.7   ALBUMIN g/dL  --   --   --  4.50       No results found for: HGBA1C, POCGLU      aspirin 325 mg Oral Daily   cetirizine 10 mg Oral Daily   polyethylene glycol 17 g Oral Daily   senna-docusate sodium 2 tablet Oral BID   sodium chloride 10 mL Intravenous Q12H      Diet Regular       Assessment/Plan     Active Hospital Problems    Diagnosis  POA   • **Closed subcapital fracture of right femur (CMS/HCC) [S72.011A]  Yes   • Vitamin D deficiency [E55.9]  Unknown   • Postoperative anemia due to acute blood loss [D62]  Yes   • Osteoporosis with pathological fracture [M80.00XA]  Yes   • Hyponatremia [E87.1]  No   • COPD (chronic obstructive pulmonary disease) (CMS/[facility]) [J44.9]  Yes   • Asthma [J45.909]  Yes   • Fall [W19.XXXA]  Yes      Resolved Hospital Problems   No resolved problems to display.       79 y.o. female admitted with Closed subcapital fracture of right femur (CMS/HCC).      Closed subcapital fracture of right femur (CMS/HCC)   Deferred to primary      COPD (chronic obstructive pulmonary disease) (CMS/HCC)   Controlled      Asthma   Zyrtec      Fall   Tolerating PT        Postoperative anemia due to acute blood loss   hgb 12.8 --> 10.9 --> 9.8 on 2/13/20   No obvious signs of blood loss      Osteoporosis with pathological fracture      Hyponatremia   Improving      Vitamin D deficiency   Consider cholecalciferol 1000 units PO qd reevaluate in 8 weeks    · ASA for DVT prophylaxis.  · CPR  · Discussed with Dr. Lopez  · Anticipate discharge possibly tomorrow with home health vs rehab -- patient wants to discuss with daughter today      KAVON Katz  [facility]  02/13/20  11:18 AM

## 2020-02-13 NOTE — THERAPY TREATMENT NOTE
Patient Name: Deborah Mcclelland  : 1940    MRN: [id number]                              Today's Date: 2020       Admit Date: 2/10/2020    Visit Dx:     ICD-10-CM ICD-9-CM   1. Closed right hip fracture, initial encounter (CMS/[facility]) S72.001A 820.8   2. Essential hypertension I10 401.9   3. Chronic obstructive pulmonary disease, unspecified COPD type (CMS/[facility]) J44.9 496     Patient Active Problem List   Diagnosis   • Closed subcapital fracture of right femur (CMS/[facility])   • COPD (chronic obstructive pulmonary disease) (CMS/[facility])   • Asthma   • Fall   • Postoperative anemia due to acute blood loss   • Osteoporosis with pathological fracture   • Hyponatremia   • Vitamin D deficiency     Past Medical History:   Diagnosis Date   • Asthma    • COPD (chronic obstructive pulmonary disease) (CMS/[facility])      Past Surgical History:   Procedure Laterality Date   • HIP SURGERY Left    • KNEE SURGERY Bilateral    • TOTAL HIP ARTHROPLASTY Right 2020    Procedure: RIGHT TOTAL HIP ARTHROPLASTY ANTERIOR WITH HANA TABLE;  Surgeon: Obed Serrano II, MD;  Location: [facility];  Service: Orthopedics;  Laterality: Right;     General Information     Row Name 20 1405          PT Evaluation Time/Intention    Document Type  therapy note (daily note)  -EE     Mode of Treatment  physical therapy  -EE     Row Name 20          General Information    Existing Precautions/Restrictions  fall  -EE     Row Name 20 1405          Cognitive Assessment/Intervention- PT/OT    Orientation Status (Cognition)  oriented x 4  -EE     Row Name 20 1405          Safety Issues, Functional Mobility    Impairments Affecting Function (Mobility)  pain;balance;strength;endurance/activity tolerance;range of motion (ROM)  -EE       User Key  (r) = Recorded By, (t) = Taken By, (c) = Cosigned By    Initials Name Provider Type    EE Usha Rosa, PT Physical Therapist        Mobility     Row Name 20 1409           Bed Mobility Assessment/Treatment    Comment (Bed Mobility)  not tested; pt up in chair in therapy gym  -EE     Row Name 02/13/20 1405          Transfer Assessment/Treatment    Comment (Transfers)  vc's for hand placement  -EE     Row Name 02/13/20 1405          Sit-Stand Transfer    Sit-Stand [place] (Transfers)  minimum assist (75% patient effort);verbal cues  -EE     Assistive Device (Sit-Stand Transfers)  walker, front-wheeled  -EE     Row Name 02/13/20 1405          Gait/Stairs Assessment/Training    [place] Level (Gait)  contact guard;verbal cues  -EE     Assistive Device (Gait)  walker, front-wheeled  -EE     Distance in Feet (Gait)  10  -EE     Pattern (Gait)  step-to  -EE     Deviations/Abnormal Patterns (Gait)  tyson decreased;right sided deviations;antalgic;stride length decreased  -EE     Bilateral Gait Deviations  forward flexed posture  -EE     Right Sided Gait Deviations  heel strike decreased;weight shift ability decreased  -EE     Comment (Gait/Stairs)  vc's for posture and gait sequencing; pt with more pain today which limited gait distance  -EE       User Key  (r) = Recorded By, (t) = Taken By, (c) = Cosigned By    Initials Name Provider Type    EE Usha Rosa, PT Physical Therapist        Obj/Interventions     Row Name 02/13/20 1406          Therapeutic Exercise    Comment (Therapeutic Exercise)  x20 reps SHIRA protocol; assist required with hip abd  -EE       User Key  (r) = Recorded By, (t) = Taken By, (c) = Cosigned By    Initials Name Provider Type    Usha Rascon, PT Physical Therapist        Goals/Plan    No documentation.       Clinical Impression     Row Name 02/13/20 1406          Pain Assessment    Additional Documentation  Pain Scale: Numbers Pre/Post-Treatment (Group)  -EE     [facility] Name 02/13/20 1406          Pain Scale: Numbers Pre/Post-Treatment    Pain Scale: Numbers, Pretreatment  0/10 - no pain  -EE     Pain Scale: Numbers, Post-Treatment  5/10  -EE     Pain Location  - Side  Right  -EE     Pain Location - Orientation  incisional  -EE     Pain Location  hip  -EE     Pain Intervention(s)  Repositioned;Rest;Elevated;Medication (See MAR)  -EE     Row Name 02/13/20 1406          Plan of Care Review    Outcome Summary  Pt demonstrates slow progress with mobility due to ongoing pain and weakness in R hip. Able to progress ther ex but continues to require assist with hip abd ROM. Ambulation also limited by pain and weakness. Would recommend pt DC to SNF for subacute rehab as pt lives alone and is currently requiring assist x1 for safety with functional mobility.   -EE     Row Name 02/13/20 1406          Positioning and Restraints    Pre-Treatment Position  sitting in chair/recliner  -EE     Post Treatment Position  chair  -EE     In Chair  reclined;call light within reach;encouraged to call for assist;exit alarm on;legs elevated  -EE       User Key  (r) = Recorded By, (t) = Taken By, (c) = Cosigned By    Initials Name Provider Type    Usha Rascon PT Physical Therapist        Outcome Measures     Row Name 02/13/20 1408          How much help from another person do you currently need...    Turning from your back to your side while in flat bed without using bedrails?  3  -EE     Moving from lying on back to sitting on the side of a flat bed without bedrails?  2  -EE     Moving to and from a bed to a chair (including a wheelchair)?  3  -EE     Standing up from a chair using your arms (e.g., wheelchair, bedside chair)?  3  -EE     Climbing 3-5 steps with a railing?  2  -EE     To walk in hospital room?  3  -EE     AM-PAC 6 Clicks Score (PT)  16  -EE     Row Name 02/13/20 1408          Functional Assessment    Outcome Measure Options  AM-PAC 6 Clicks Basic Mobility (PT)  -EE       User Key  (r) = Recorded By, (t) = Taken By, (c) = Cosigned By    Initials Name Provider Type    Usha Rascon PT Physical Therapist          PT Recommendation and Plan  Planned Therapy Interventions (PT  Eval): balance training, bed mobility training, gait training, home exercise program, patient/family education, ROM (range of motion), stair training, strengthening, stretching, transfer training  Outcome Summary/Treatment Plan (PT)  Anticipated Equipment Needs at Discharge (PT): front wheeled walker, bedside commode(if DC home)  Anticipated Discharge Disposition (PT): skilled nursing facility  Plan of Care Reviewed With: patient  Outcome Summary: Pt demonstrates slow progress with mobility due to ongoing pain and weakness in R hip. Able to progress ther ex but continues to require assist with hip abd ROM. Ambulation also limited by pain and weakness. Would recommend pt DC to SNF for subacute rehab as pt lives alone and is currently requiring assist x1 for safety with functional mobility.      Time Calculation:   PT Charges     Row Name 02/13/20 1408             Time Calculation    Start Time  1033  -EE      Stop Time  1110  -EE      Time Calculation (min)  37 min  -EE      PT Received On  02/13/20  -EE      PT - Next Appointment  02/13/20  -EE         Time Calculation- PT    Total Timed Code Minutes- PT  19 minute(s)  -EE        User Key  (r) = Recorded By, (t) = Taken By, (c) = Cosigned By    Initials Name Provider Type    EE Usha Rosa, PT Physical Therapist        Therapy Charges for Today     Code Description Service Date Service Provider Modifiers Qty    88250018871 HC PT EVAL LOW COMPLEXITY 2 2/12/2020 Usha Rosa, PT GP 1    10881630097 HC PT THER PROC EA 15 MIN 2/12/2020 Usha Rosa, PT GP 1    24742457997 HC PT THER PROC EA 15 MIN 2/13/2020 Usha Rosa, PT GP 1    16476652915 HC PT THER PROC GROUP 2/13/2020 Usha Rosa, PT GP 1          PT G-Codes  Outcome Measure Options: AM-PAC 6 Clicks Basic Mobility (PT)  AM-PAC 6 Clicks Score (PT): 16    Usha Rosa PT  2/13/2020

## 2020-02-13 NOTE — THERAPY TREATMENT NOTE
Patient Name: Deborah Mcclelland  : 1940    MRN: [id number]                              Today's Date: 2020       Admit Date: 2/10/2020    Visit Dx:     ICD-10-CM ICD-9-CM   1. Closed right hip fracture, initial encounter (CMS/[facility]) S72.001A 820.8   2. Essential hypertension I10 401.9   3. Chronic obstructive pulmonary disease, unspecified COPD type (CMS/[facility]) J44.9 496     Patient Active Problem List   Diagnosis   • Closed subcapital fracture of right femur (CMS/[facility])   • COPD (chronic obstructive pulmonary disease) (CMS/[facility])   • Asthma   • Fall   • Postoperative anemia due to acute blood loss   • Osteoporosis with pathological fracture   • Hyponatremia   • Vitamin D deficiency     Past Medical History:   Diagnosis Date   • Asthma    • COPD (chronic obstructive pulmonary disease) (CMS/[facility])      Past Surgical History:   Procedure Laterality Date   • HIP SURGERY Left    • KNEE SURGERY Bilateral    • TOTAL HIP ARTHROPLASTY Right 2020    Procedure: RIGHT TOTAL HIP ARTHROPLASTY ANTERIOR WITH HANA TABLE;  Surgeon: Obed Serrano II, MD;  Location: [facility];  Service: Orthopedics;  Laterality: Right;     General Information     Row Name 20 1434 20 1405       PT Evaluation Time/Intention    Document Type  therapy note (daily note)  -MS  therapy note (daily note)  -EE    Mode of Treatment  physical therapy  -MS  physical therapy  -EE    Row Name 20 1434 20 1405       General Information    Existing Precautions/Restrictions  fall  -MS  fall  -EE    Row Name 20 1434 20 1405       Cognitive Assessment/Intervention- PT/OT    Orientation Status (Cognition)  oriented x 4  -MS  oriented x 4  -EE    Row Name 20 1405          Safety Issues, Functional Mobility    Impairments Affecting Function (Mobility)  pain;balance;strength;endurance/activity tolerance;range of motion (ROM)  -EE       User Key  (r) = Recorded By, (t) = Taken By, (c) = Cosigned By    Initials  Name Provider Type    EE Usha Rosa, PT Physical Therapist    MS Anna Berg EDWIGE, PT Physical Therapist        Mobility     Row Name 02/13/20 1434 02/13/20 1405       Bed Mobility Assessment/Treatment    Comment (Bed Mobility)  NT - UIC upon PT arrival  -MS  not tested; pt up in chair in therapy gym  -EE    Row Name 02/13/20 1405          Transfer Assessment/Treatment    Comment (Transfers)  vc's for hand placement  -EE     Row Name 02/13/20 1434 02/13/20 1405       Sit-Stand Transfer    Sit-Stand [place] (Transfers)  minimum assist (75% patient effort);verbal cues;nonverbal cues (demo/gesture)  -MS  minimum assist (75% patient effort);verbal cues  -EE    Assistive Device (Sit-Stand Transfers)  walker, front-wheeled  -MS  walker, front-wheeled  -EE    Row Name 02/13/20 1434 02/13/20 1405       Gait/Stairs Assessment/Training    Gait/Stairs Assessment/Training  gait/ambulation independence  -MS  --    [place] Level (Gait)  contact guard;verbal cues;nonverbal cues (demo/gesture)  -MS  contact guard;verbal cues  -EE    Assistive Device (Gait)  walker, front-wheeled  -MS  walker, front-wheeled  -EE    Distance in Feet (Gait)  8  -MS  10  -EE    Pattern (Gait)  step-to  -MS  step-to  -EE    Deviations/Abnormal Patterns (Gait)  tyson decreased;right sided deviations;antalgic;stride length decreased  -MS  tyson decreased;right sided deviations;antalgic;stride length decreased  -EE    Bilateral Gait Deviations  forward flexed posture  -MS  forward flexed posture  -EE    Right Sided Gait Deviations  heel strike decreased;weight shift ability decreased  -MS  heel strike decreased;weight shift ability decreased  -EE    Comment (Gait/Stairs)  Postural cues- limited due to soreness.  -MS  vc's for posture and gait sequencing; pt with more pain today which limited gait distance  -EE      User Key  (r) = Recorded By, (t) = Taken By, (c) = Cosigned By    Initials Name Provider Type    EE Usha Rosa, OSCAR Physical  Therapist    Anna Caballero, PT Physical Therapist        Obj/Interventions     Row Name 02/13/20 1435 02/13/20 1406       Therapeutic Exercise    Comment (Therapeutic Exercise)  x 25 reps SHIRA protocol- assist for hip ABD  -MS  x20 reps SHIRA protocol; assist required with hip abd  -EE      User Key  (r) = Recorded By, (t) = Taken By, (c) = Cosigned By    Initials Name Provider Type    EE Usha Rosa, PT Physical Therapist    Anna Caballero, PT Physical Therapist        Goals/Plan    No documentation.       Clinical Impression     Row Name 02/13/20 1406          Pain Assessment    Additional Documentation  Pain Scale: Numbers Pre/Post-Treatment (Group)  -EE     Row Name 02/13/20 1435 02/13/20 1406       Pain Scale: Numbers Pre/Post-Treatment    Pain Scale: Numbers, Pretreatment  6/10  -MS  0/10 - no pain  -EE    Pain Scale: Numbers, Post-Treatment  6/10  -MS  5/10  -EE    Pain Location - Side  Right  -MS  Right  -EE    Pain Location - Orientation  incisional  -MS  incisional  -EE    Pain Location  hip  -MS  hip  -EE    Pain Intervention(s)  Repositioned;Ambulation/increased activity;Rest  -MS  Repositioned;Rest;Elevated;Medication (See MAR)  -EE    Row Name 02/13/20 1406          Plan of Care Review    Outcome Summary  Pt demonstrates slow progress with mobility due to ongoing pain and weakness in R hip. Able to progress ther ex but continues to require assist with hip abd ROM. Ambulation also limited by pain and weakness. Would recommend pt DC to SNF for subacute rehab as pt lives alone and is currently requiring assist x1 for safety with functional mobility.   -EE     Row Name 02/13/20 1435 02/13/20 1406       Positioning and Restraints    Pre-Treatment Position  sitting in chair/recliner  -MS  sitting in chair/recliner  -EE    Post Treatment Position  chair  -MS  chair  -EE    In Chair  sitting;call light within reach;encouraged to call for assist;legs elevated  -MS  reclined;call light within  reach;encouraged to call for assist;exit alarm on;legs elevated  -EE      User Key  (r) = Recorded By, (t) = Taken By, (c) = Cosigned By    Initials Name Provider Type    EE Usha Rosa, PT Physical Therapist    Anna Caballero, PT Physical Therapist        Outcome Measures     Row Name 02/13/20 1435 02/13/20 1408       How much help from another person do you currently need...    Turning from your back to your side while in flat bed without using bedrails?  3  -MS  3  -EE    Moving from lying on back to sitting on the side of a flat bed without bedrails?  2  -MS  2  -EE    Moving to and from a bed to a chair (including a wheelchair)?  3  -MS  3  -EE    Standing up from a chair using your arms (e.g., wheelchair, bedside chair)?  3  -MS  3  -EE    Climbing 3-5 steps with a railing?  2  -MS  2  -EE    To walk in hospital room?  2  -MS  3  -EE    AM-PAC 6 Clicks Score (PT)  15  -MS  16  -EE    Row Name 02/13/20 1408          Functional Assessment    Outcome Measure Options  AM-PAC 6 Clicks Basic Mobility (PT)  -EE       User Key  (r) = Recorded By, (t) = Taken By, (c) = Cosigned By    Initials Name Provider Type    Usha Rascon, PT Physical Therapist    Anna Caballero, PT Physical Therapist          PT Recommendation and Plan           Time Calculation:   PT Charges     Row Name 02/13/20 1434 02/13/20 1408          Time Calculation    Start Time  1356  -MS  1033  -EE     Stop Time  1414  -MS  1110  -EE     Time Calculation (min)  18 min  -MS  37 min  -EE     PT Received On  02/13/20  -MS  02/13/20  -EE     PT - Next Appointment  02/14/20  -MS  02/13/20  -EE        Time Calculation- PT    Total Timed Code Minutes- PT  --  19 minute(s)  -EE       User Key  (r) = Recorded By, (t) = Taken By, (c) = Cosigned By    Initials Name Provider Type    Usha Rascon, PT Physical Therapist    Anna Caballero, PT Physical Therapist        Therapy Charges for Today     Code Description Service Date Service  Provider Modifiers Qty    90106710776 HC PT THER PROC GROUP 2/13/2020 Anna Berg, PT GP 1    88516183468 HC PT THER PROC EA 15 MIN 2/13/2020 Anna Berg, PT GP 1          PT G-Codes  Outcome Measure Options: AM-PAC 6 Clicks Basic Mobility (PT)  AM-PAC 6 Clicks Score (PT): 15    Anna Berg, PT  2/13/2020

## 2020-02-13 NOTE — PLAN OF CARE
Problem: Patient Care Overview  Goal: Plan of Care Review  Outcome: Ongoing (interventions implemented as appropriate)  Flowsheets (Taken 2/13/2020 0832)  Progress: improving  Plan of Care Reviewed With: patient  Outcome Summary: Ambulates with walker use and 1 assist. Voiding adequately. No BM this shift, bowel regimen continued. Pain well controlled. YAQUELIN intact. No skin breakdown. Plans to d/c home with HH on friday. Will continue to monitor.

## 2020-02-13 NOTE — PROGRESS NOTES
Orthopaedic Surgery   Daily Progress Note  Dr. DANIELA Serrano II  [phone]  DEMOGRAPHICS:   · Deborah Mcclelland   · Age:79 y.o.   · MRN:[id number]  · Admitted: 2/10/2020    PROCEDURE: 2 Days Post-Op s/p Procedure(s):  RIGHT TOTAL HIP ARTHROPLASTY ANTERIOR WITH HANA TABLE     HOSPITAL PROGRESS  · Patient Issues: Some weakness in the leg due to her fracture and recent surgery, but she seems to be doing great  · Ambulation/Activity: Ambulating well with PT/Nursing.      VITALS:  Vitals:    02/13/20 0354 02/13/20 0657 02/13/20 1100 02/13/20 1457   BP: 138/67 143/67 118/57 116/53   BP Location: Left arm Left arm Left arm Left arm   Patient Position: Lying Lying Sitting Sitting   Pulse: 92 95 95 97   Resp: 16 16 16 16   Temp: 99.1 °F (37.3 °C) 99.2 °F (37.3 °C) 99.7 °F (37.6 °C) 100.2 °F (37.9 °C)   TempSrc: Oral Oral Oral Oral   SpO2: 93% 94% 95% 92%   Weight:       Height:           PHYSICAL EXAM:  · LUNGS: Equal chest rise, no shortness of air  · CARDIOVASCULAR: brisk capillary refill intact  · WOUND: YAQUELIN Wound Vac System working in good condition, minimal drainage  · EXTREMITY: Operative Leg  · Pulses: brisk capillary refill intact  · Sensation: Sensation intact to light touch to the saphenous/sural/tibial/deep peroneal/superficial peroneal nerves, and grossly throughout the extremity.  · Motor: 5/5 EHL/FHL/TA/GS motor complexes    LABS:   Lab Results   Component Value Date    HGB 9.8 (L) 02/13/2020     Lab Results   Component Value Date    WBC 5.29 02/13/2020     Lab Results   Component Value Date    GLUCOSE 113 (H) 02/13/2020    CALCIUM 8.2 (L) 02/13/2020     (L) 02/13/2020    K 3.7 02/13/2020    CO2 26.2 02/13/2020    CL 99 02/13/2020    BUN 10 02/13/2020    CREATININE 0.52 (L) 02/13/2020    EGFRIFNONA 114 02/13/2020    BCR 19.2 02/13/2020    ANIONGAP 9.8 02/13/2020       ASSESSMENT: Patient is a 79 y.o. female who is 2 Days Post-Op s/p Procedure(s):  RIGHT TOTAL HIP ARTHROPLASTY ANTERIOR WITH HANA  "TABLE     PLAN:   · Weight Bearing: Weight Bearing As Tolerated  · Labs: Above lab values review. Plan: No intervention necessary at this time.   · PT/OT: To Mobilize  · DVT PPX: ASA 325mg Daily for 30 days  · Post-Op Xray: SHIRA implants in good position.   · Follow-Up: In office at 3 weeks postop  · Dispo: She will require SNF.  From my standpoint she can go tomorrow or Saturday pending approval    R \"Benjamin\" Zach MANDUJANO MD  Orthopaedic Surgery  [facility]  [phone] - [place] [facility]  [phone] - [place] Office      "

## 2020-02-13 NOTE — PROGRESS NOTES
Continued Stay Note  [facility]     Patient Name: Deborah Mcclelland  MRN: [id number]  Today's Date: 2/13/2020    Admit Date: 2/10/2020    Discharge Plan     Row Name 02/13/20 1507       Plan    Plan  [facility]     Patient/Family in Agreement with Plan  yes    Plan Comments  [facility] will accept per Carrie. Pt notified and agreeable w/ dc to [facility].         Discharge Codes    No documentation.             Magalie Cooper RN

## 2020-02-14 VITALS
HEART RATE: 80 BPM | OXYGEN SATURATION: 93 % | WEIGHT: 175.6 LBS | BODY MASS INDEX: 27.56 KG/M2 | DIASTOLIC BLOOD PRESSURE: 55 MMHG | SYSTOLIC BLOOD PRESSURE: 113 MMHG | HEIGHT: 67 IN | TEMPERATURE: 98.3 F | RESPIRATION RATE: 16 BRPM

## 2020-02-14 PROCEDURE — 97116 GAIT TRAINING THERAPY: CPT

## 2020-02-14 PROCEDURE — 97150 GROUP THERAPEUTIC PROCEDURES: CPT

## 2020-02-14 PROCEDURE — 97110 THERAPEUTIC EXERCISES: CPT

## 2020-02-14 RX ORDER — ASPIRIN 325 MG
325 TABLET ORAL DAILY
Qty: 30 TABLET | Refills: 0
Start: 2020-02-15 | End: 2020-03-16

## 2020-02-14 RX ORDER — ERGOCALCIFEROL 1.25 MG/1
50000 CAPSULE ORAL
Qty: 5 CAPSULE
Start: 2020-02-20 | End: 2020-03-27

## 2020-02-14 RX ORDER — HYDROCODONE BITARTRATE AND ACETAMINOPHEN 5; 325 MG/1; MG/1
1 TABLET ORAL EVERY 4 HOURS PRN
Qty: 12 TABLET | Refills: 0 | Status: SHIPPED | OUTPATIENT
Start: 2020-02-14

## 2020-02-14 RX ORDER — PANTOPRAZOLE SODIUM 40 MG/1
40 TABLET, DELAYED RELEASE ORAL DAILY
Start: 2020-02-14

## 2020-02-14 RX ADMIN — PANTOPRAZOLE SODIUM 40 MG: 40 TABLET, DELAYED RELEASE ORAL at 06:11

## 2020-02-14 RX ADMIN — HYDROCODONE BITARTRATE AND ACETAMINOPHEN 1 TABLET: 5; 325 TABLET ORAL at 04:21

## 2020-02-14 RX ADMIN — SENNOSIDES AND DOCUSATE SODIUM 2 TABLET: 8.6; 5 TABLET ORAL at 08:27

## 2020-02-14 RX ADMIN — HYDROCODONE BITARTRATE AND ACETAMINOPHEN 1 TABLET: 5; 325 TABLET ORAL at 13:46

## 2020-02-14 RX ADMIN — POLYETHYLENE GLYCOL 3350 17 G: 17 POWDER, FOR SOLUTION ORAL at 08:27

## 2020-02-14 RX ADMIN — HYDROCODONE BITARTRATE AND ACETAMINOPHEN 1 TABLET: 5; 325 TABLET ORAL at 08:27

## 2020-02-14 RX ADMIN — ASPIRIN 325 MG: 325 TABLET ORAL at 08:27

## 2020-02-14 NOTE — THERAPY TREATMENT NOTE
Patient Name: Deborah Mcclelland  : 1940    MRN: [id number]                              Today's Date: 2020       Admit Date: 2/10/2020    Visit Dx:     ICD-10-CM ICD-9-CM   1. Closed right hip fracture, initial encounter (CMS/[facility]) S72.001A 820.8   2. Essential hypertension I10 401.9   3. Chronic obstructive pulmonary disease, unspecified COPD type (CMS/[facility]) J44.9 496     Patient Active Problem List   Diagnosis   • Closed subcapital fracture of right femur (CMS/[facility])   • COPD (chronic obstructive pulmonary disease) (CMS/[facility])   • Asthma   • Fall   • Postoperative anemia due to acute blood loss   • Osteoporosis with pathological fracture   • Hyponatremia   • Vitamin D deficiency     Past Medical History:   Diagnosis Date   • Asthma    • COPD (chronic obstructive pulmonary disease) (CMS/[facility])      Past Surgical History:   Procedure Laterality Date   • HIP SURGERY Left    • KNEE SURGERY Bilateral    • TOTAL HIP ARTHROPLASTY Right 2020    Procedure: RIGHT TOTAL HIP ARTHROPLASTY ANTERIOR WITH HANA TABLE;  Surgeon: Obed Serrano II, MD;  Location: [facility];  Service: Orthopedics;  Laterality: Right;     General Information     Row Name 20 1058          PT Evaluation Time/Intention    Document Type  therapy note (daily note)  -AE     Mode of Treatment  physical therapy;group therapy  -AE     Row Name 20 1058          General Information    Patient Profile Reviewed?  yes  -AE       User Key  (r) = Recorded By, (t) = Taken By, (c) = Cosigned By    Initials Name Provider Type    AE Cralotta Beaver, PT Physical Therapist        Mobility     Row Name 20 1058          Bed Mobility Assessment/Treatment    Comment (Bed Mobility)  UIC  -AE     Row Name 20 1058          Transfer Assessment/Treatment    Comment (Transfers)  required multiple attempts due to weakness, CGA for sit<>stand transfer with FWW  -AE     Row Name 20 1054          Bed-Chair Transfer    Bed-Chair  [place] (Transfers)  contact guard  -AE     Assistive Device (Bed-Chair Transfers)  walker, front-wheeled  -AE     Row Name 02/14/20 1058          Sit-Stand Transfer    Sit-Stand [place] (Transfers)  contact guard  -AE     Assistive Device (Sit-Stand Transfers)  walker, front-wheeled  -AE     Row Name 02/14/20 1058          Gait/Stairs Assessment/Training    Gait/Stairs Assessment/Training  gait/ambulation independence;gait/ambulation assistive device  -AE     [place] Level (Gait)  contact guard  -AE     Assistive Device (Gait)  walker, front-wheeled  -AE     Distance in Feet (Gait)  10  -AE       User Key  (r) = Recorded By, (t) = Taken By, (c) = Cosigned By    Initials Name Provider Type    Carlotta Reeves PT Physical Therapist        Obj/Interventions     Row Name 02/14/20 1059          Therapeutic Exercise    Comment (Therapeutic Exercise)  SHIRA protocol x 20, assist for hip abduction, unable to perform SLR  -AE       User Key  (r) = Recorded By, (t) = Taken By, (c) = Cosigned By    Initials Name Provider Type    Carlotta Reeves PT Physical Therapist        Goals/Plan    No documentation.       Clinical Impression     Row Name 02/14/20 1059          Pain Assessment    Additional Documentation  Pain Scale: Numbers Pre/Post-Treatment (Group)  -AE     [facility] Name 02/14/20 1059          Pain Scale: Numbers Pre/Post-Treatment    Pain Scale: Numbers, Pretreatment  5/10  -AE     Pain Scale: Numbers, Post-Treatment  5/10  -AE     Pain Location - Side  Right  -AE     Pain Location - Orientation  incisional  -AE     Pain Location  hip  -AE     Pain Intervention(s)  Repositioned;Ambulation/increased activity;Rest  -AE     Row Name 02/14/20 1059          Positioning and Restraints    Pre-Treatment Position  sitting in chair/recliner  -AE     Post Treatment Position  chair  -AE     In Chair  reclined;call light within reach;encouraged to call for assist;exit alarm on  -AE       User Key  (r) = Recorded  By, (t) = Taken By, (c) = Cosigned By    Initials Name Provider Type    AE Carlotta Beaver, PT Physical Therapist        Outcome Measures    No documentation.         PT Recommendation and Plan           Time Calculation:   PT Charges     Row Name 02/14/20 1100             Time Calculation    Start Time  0834  -AE      Stop Time  0920  -AE      Time Calculation (min)  46 min  -AE      PT Received On  02/14/20  -AE      PT - Next Appointment  02/15/20  -AE         Time Calculation- PT    Total Timed Code Minutes- PT  30 minute(s)  -AE        User Key  (r) = Recorded By, (t) = Taken By, (c) = Cosigned By    Initials Name Provider Type    AE Carlotta Beaver, PT Physical Therapist        Therapy Charges for Today     Code Description Service Date Service Provider Modifiers Qty    76282168212 HC PT THER PROC GROUP 2/14/2020 Carlotta Beaver PT GP 1    31318361332 HC PT THER PROC EA 15 MIN 2/14/2020 Carlotta Beaver, PT GP 1    69735244032 HC GAIT TRAINING EA 15 MIN 2/14/2020 Carlotta Beaver PT GP 1          PT G-Codes  Outcome Measure Options: AM-PAC 6 Clicks Basic Mobility (PT)  AM-PAC 6 Clicks Score (PT): 15    Carlotta Beaver PT  2/14/2020

## 2020-02-14 NOTE — DISCHARGE SUMMARY
Patient Name: Deborah Mcclelland  : 1940  MRN: [id number]    Date of Admission: 2/10/2020  Date of Discharge:  2020  Primary Care Physician: Sean Ryder MD      Chief Complaint:   Fall and Hip Pain      Discharge Diagnoses     Active Hospital Problems    Diagnosis  POA   • **Closed subcapital fracture of right femur (CMS/[facility]) [S72.011A]  Yes   • Vitamin D deficiency [E55.9]  Unknown   • Postoperative anemia due to acute blood loss [D62]  Yes   • Osteoporosis with pathological fracture [M80.00XA]  Yes   • Hyponatremia [E87.1]  No   • COPD (chronic obstructive pulmonary disease) (CMS/[facility]) [J44.9]  Yes   • Asthma [J45.909]  Yes   • Fall [W19.XXXA]  Yes      Resolved Hospital Problems   No resolved problems to display.        Hospital Course     Ms. Mcclelland is a 79 y.o. non-smoker with a history of asthma/COPD who presented to [facility] initially complaining of hip pain after a fall.  Please see the admitting history and physical for further details.  She was found to have fractured right femur in the ED and was admitted to the hospital for further evaluation and treatment.  She was seen by orthopedic surgery and underwent the below mentioned surgical procedure.  She has had a relatively stable postoperative course and is progressed with her physical therapy.  Her vitamin D level was low (18.6) and she tells me she takes vitamin D OTC at home.  Will start her on higher dose replacement for now and she needs to have this monitored by her PCP.  She has had a mild postoperative blood loss anemia but is otherwise stable.  She did not require blood transfusion.  Arrangements have been made [facility]  She will follow-up with Dr. Serrano in 3 weeks.  She is weightbearing as tolerated.  She is to take aspirin 3 and 25 mg daily for 30 days for DVT prophylaxis.      Day of Discharge     Doing well and ready for DC to rehab.     Physical Exam:  Temp:   [97.1 °F (36.2 °C)-100.2 °F (37.9 °C)] 98.3 °F (36.8 °C)  Heart Rate:  [80-98] 80  Resp:  [16] 16  BP: (113-147)/(53-63) 113/55  Body mass index is 27.5 kg/m².  Physical Exam   Constitutional: She is oriented to person, place, and time. No distress.   HENT:   Head: Normocephalic and atraumatic.   Eyes:   Right upper lid hordeolum    Cardiovascular: Normal rate and normal heart sounds.   Pulmonary/Chest: Effort normal and breath sounds normal. No respiratory distress. She has no wheezes.   Abdominal: Soft. Bowel sounds are normal.   Musculoskeletal: She exhibits no edema or deformity.   Limited ROM on RLE 2/2 post-op right total hip arthroplasty day 2 minimal to moderate pain --  Expected finding   Neurological: She is alert and oriented to person, place, and time.   Skin: Skin is warm and dry. She is not diaphoretic.   Psychiatric: She has a normal mood and affect. Her behavior is normal.       Consultants     Consult Orders (all) (From admission, onward)     Start     Ordered    02/10/20 1234  Inpatient Orthopedic Surgery Consult  Once     Specialty:  Orthopedic Surgery  Provider:  Obed Serrano II, MD    02/10/20 1235              Procedures     RIGHT TOTAL HIP ARTHROPLASTY ANTERIOR WITH HANA TABLE  Surgeon: Obed Serrano II, MD  Date of Service:  02/11/20     XR Chest 1 View [[id number]] Collected:  02/10/20 1227     Updated:  02/10/20 1236    Narrative:       XR CHEST 1 VW-     HISTORY: Female who is 79 years-old,  preoperative evaluation     TECHNIQUE: Frontal views of the chest     COMPARISON: 06/25/2013     FINDINGS: Heart size is normal. Aorta is calcified. Pulmonary  vasculature is unremarkable. No focal pulmonary consolidation, pleural  effusion, or pneumothorax is seen, limited by supine positioning.  Chronic appearing right rib deformities.       Impression:       No focal pulmonary consolidation.       XR Hip With or Without Pelvis 2 - 3 View Right [[id number]] Collected:  02/10/20  1226     Updated:  02/10/20 1230    Narrative:       XR HIP W OR WO PELVIS 2-3 VIEW RIGHT-     INDICATIONS: Trauma     TECHNIQUE: Frontal view of the pelvis, 2 views of the right hip     COMPARISON: None available     FINDINGS:  Angulated subcapital right femoral neck fracture is noted. No other  fractures are identified. The bones are diffusely osteopenic. Left hip  arthroplasty hardware is partly included. Degenerative changes are seen  in the partly included lower lumbar spine. Nonspecific pelvic soft  tissue calcifications.          Impression:          Right femoral neck fracture.                Pertinent Labs     Results from last 7 days   Lab Units 02/13/20  0456 02/12/20  0436 02/11/20  0445 02/10/20  1247   WBC 10*3/mm3 5.29 7.36 4.87 6.57   HEMOGLOBIN g/dL 9.8* 10.9* 12.8 14.0   PLATELETS 10*3/mm3 135* 141 155 176     Results from last 7 days   Lab Units 02/13/20  0456 02/12/20  0436 02/11/20  0445 02/10/20  1247   SODIUM mmol/L 135* 131* 132* 137   POTASSIUM mmol/L 3.7 4.0 4.0 3.6   CHLORIDE mmol/L 99 95* 99 98   CO2 mmol/L 26.2 22.6 24.3 23.3   BUN mg/dL 10 8 8 11   CREATININE mg/dL 0.52* 0.66 0.62 0.64   GLUCOSE mg/dL 113* 112* 130* 103*   Estimated Creatinine Clearance: 62.9 mL/min (A) (by C-G formula based on SCr of 0.52 mg/dL (L)).  Results from last 7 days   Lab Units 02/10/20  1247   ALBUMIN g/dL 4.50   BILIRUBIN mg/dL 0.6   ALK PHOS U/L 126*   AST (SGOT) U/L 21   ALT (SGPT) U/L 20     Results from last 7 days   Lab Units 02/13/20  0456 02/12/20  0436 02/11/20  0445 02/10/20  1247   CALCIUM mg/dL 8.2* 8.6 8.6 9.7   ALBUMIN g/dL  --   --   --  4.50   PHOSPHORUS mg/dL 2.5  --   --   --              Test Results Pending at Discharge       Discharge Details        Discharge Medications      New Medications      Instructions Start Date   aspirin 325 MG tablet   325 mg, Oral, Daily   Start Date:  February 15, 2020     calcium-vitamin D 500-200 MG-UNIT tablet per tablet   500 mg, Oral, 2 Times Daily         HYDROcodone-acetaminophen 5-325 MG per tablet  Commonly known as:  NORCO   1 tablet, Oral, Every 4 Hours PRN      pantoprazole 40 MG EC tablet  Commonly known as:  PROTONIX   40 mg, Oral, Daily      polyethylene glycol pack packet  Commonly known as:  MIRALAX   17 g, Oral, Daily   Start Date:  February 15, 2020     vitamin D 1.25 MG (30871 UT) capsule capsule  Commonly known as:  ERGOCALCIFEROL   50,000 Units, Oral, Every 7 Days   Start Date:  February 20, 2020        Continue These Medications      Instructions Start Date   cetirizine 10 MG tablet  Commonly known as:  zyrTEC   10 mg, Oral, Daily      ipratropium-albuterol  MCG/ACT inhaler  Commonly known as:  COMBIVENT RESPIMAT   1 puff, Inhalation, 4 Times Daily PRN         Stop These Medications    Unable to find            No Known Allergies      Discharge Disposition:  Skilled Nursing Facility (DC - External)    Discharge Diet:  Diet Order   Procedures   • Diet Regular       Discharge Activity:   Activity Instructions     Activity as Tolerated            CODE STATUS:    Code Status and Medical Interventions:   Ordered at: 02/10/20 1235     Level Of Support Discussed With:    Patient     Code Status:    CPR     Medical Interventions (Level of Support Prior to Arrest):    Full       No future appointments.   Contact information for follow-up providers     Sean Ryder MD Follow up in 2 week(s).    Specialty:  Internal Medicine  Contact information:  [address]  [facility] [id number] [phone]             Obed Serrano II, MD Follow up in 3 week(s).    Specialty:  Orthopedic Surgery  Contact information:  [address]  [facility] [address]  [phone]                   Contact information for after-discharge care     Destination     [facility] .    Service:  Skilled Nursing  Contact information:  [address] [facility] [address] [phone]                             Time  Spent on Discharge:  Greater than 30 minutes      Robert Lopez MD  [facility]  02/14/20  8:36 AM

## 2020-02-14 NOTE — PROGRESS NOTES
Case Management Discharge Note      Final Note: dc to [facility] w/ family/private auto.     Provided post acute provider list?: Yes  Post Acute Provider List: Home Health, Inpatient Rehab  Delivered To: Patient  Method of Delivery: In person    Destination - Selection Complete      Service Provider Request Status Selected Services Address Phone Number Fax Number    [facility] Selected Skilled Nursing [address] [facility] [address] [phone]      Durable Medical Equipment      No service has been selected for the patient.      Dialysis/Infusion      No service has been selected for the patient.      Home Medical Care - Selection Complete      Service Provider Request Status Selected Services Address Phone Number Fax Number    [facility] [place] Selected Home Health Services [address] [address] [phone]      Therapy      No service has been selected for the patient.      Community Resources      No service has been selected for the patient.             Final Discharge Disposition Code: 03 - skilled nursing facility (SNF)

## 2020-02-14 NOTE — PLAN OF CARE
Problem: Patient Care Overview  Goal: Plan of Care Review  Outcome: Ongoing (interventions implemented as appropriate)  Note:   Pt completed group therapy this morning performing 20 reps of SHIRA protocol requiring assist to perform LAQs. Pt requires multiple attempts at sit<>stand transfer but is able to perform with CGA. Pt has difficulty advancing RLE during gait training, only able to ambulate 10 ft with CGA. Skilled PT needed to address above impairments, DC recs include SNU/[facility].

## 2020-02-14 NOTE — PLAN OF CARE
Problem: Patient Care Overview  Goal: Plan of Care Review  Outcome: Ongoing (interventions implemented as appropriate)  Flowsheets (Taken 2/14/2020 4500)  Progress: improving  Plan of Care Reviewed With: patient  Outcome Summary: Pt ambulating with walker and assist x1. VSS. Voiding fx is intact. Pain is controlled with po meds. Pt educated on fall prevention and pulmonary toilet. Pt anticipates d/c to rehab when ready.

## 2020-02-14 NOTE — PROGRESS NOTES
Orthopaedic Surgery   Daily Progress Note  Dr. DANIELA Serrano II  [phone]  DEMOGRAPHICS:   · Deborah Mcclelland   · Age:79 y.o.   · MRN:[id number]  · Admitted: 2/10/2020    PROCEDURE: 3 Days Post-Op s/p Procedure(s):  RIGHT TOTAL HIP ARTHROPLASTY ANTERIOR WITH HANA TABLE     HOSPITAL PROGRESS  · Patient Issues: Pain well controlled.  Sitting up in the chair.  No new issues  · Ambulation/Activity: Ambulating well with PT/Nursing.      VITALS:  Vitals:    02/13/20 1936 02/13/20 2343 02/14/20 0415 02/14/20 0658   BP: 147/63 125/63 134/63 113/55   BP Location: Left arm Left arm Left arm Left arm   Patient Position: Sitting Sitting Lying Sitting   Pulse: 98 94 90 80   Resp: 16 16 16 16   Temp: 97.1 °F (36.2 °C) 100.2 °F (37.9 °C) 97.9 °F (36.6 °C) 98.3 °F (36.8 °C)   TempSrc: Oral Oral Oral Oral   SpO2: 96% 94% 93% 93%   Weight:       Height:           PHYSICAL EXAM:  · LUNGS: Equal chest rise, no shortness of air  · CARDIOVASCULAR: brisk capillary refill intact  · WOUND: YAQUELIN Wound Vac System working in good condition, minimal drainage  · EXTREMITY: Operative Leg  · Pulses: brisk capillary refill intact  · Sensation: Sensation intact to light touch to the saphenous/sural/tibial/deep peroneal/superficial peroneal nerves, and grossly throughout the extremity.  · Motor: 5/5 EHL/FHL/TA/GS motor complexes    LABS:   Lab Results   Component Value Date    HGB 9.8 (L) 02/13/2020     Lab Results   Component Value Date    WBC 5.29 02/13/2020     Lab Results   Component Value Date    GLUCOSE 113 (H) 02/13/2020    CALCIUM 8.2 (L) 02/13/2020     (L) 02/13/2020    K 3.7 02/13/2020    CO2 26.2 02/13/2020    CL 99 02/13/2020    BUN 10 02/13/2020    CREATININE 0.52 (L) 02/13/2020    EGFRIFNONA 114 02/13/2020    BCR 19.2 02/13/2020    ANIONGAP 9.8 02/13/2020       ASSESSMENT: Patient is a 79 y.o. female who is 3 Days Post-Op s/p Procedure(s):  RIGHT TOTAL HIP ARTHROPLASTY ANTERIOR WITH HANA TABLE     PLAN:   · Weight Bearing:  "Weight Bearing As Tolerated  · Labs: Above lab values review. Plan: No new labs today  · PT/OT: To Mobilize  · DVT PPX: ASA 325mg Daily for 30 days  · Post-Op Xray: SHIRA implants in good position.   · Follow-Up: In office at 3 weeks postop  · Dispo: SNF when approved    R \"Benjamin\" Zach MANDUJANO MD  Orthopaedic Surgery  [facility]  [phone] - [place] Office  [phone] - [place] Office      "

## 2020-02-14 NOTE — DISCHARGE INSTR - ACTIVITY
Total Hip Replacement Discharge Instructions:     I. ACTIVITIES:     1. Exercises:  · Complete exercise program as taught by the hospital physical therapist 2 times per day  · Exercise program will be advanced by the physical therapist  · During the day be up ambulating every 2 hours (while awake) for short distances  · Complete the ankle pump exercises at least 10 times per hour (while awake)  · Elevate legs when in bed and for at least 30 minutes during the day.Use cold packs 20-30 minutes approximately 5 times per day. This should be done before and after completing your exercises and at any time you are experiencing pain/ stiffness in your operative extremity.     2. Activities of Daily Living:  · No tub baths, hot tubs, or swimming pools for 4 weeks  · May shower and let water run over the incision on post-operative day #5 if no drainage. Do not scrub or rub the incision. Simply let the water run over the incision and pat dry.     II. Restrictions  · Continue hip precautions as taught at the hospital  · Your surgeon will discuss with you when you will be able to drive again.  · Weight bearing is as tolerated  · First week stay inside on even terrain. May go up and down stairs one stair at a time utilizing the hand rail once cleared by physical therapy to do so.  · After one week, you may venture outside (if cleared to do so by physical therapist).     III. Precautions:  · Everyone that comes near you should wash their hands  · No elective dental, genital-urinary, or colon procedures or surgical procedures for 12 weeks after surgery unless absolutely necessary.  ·  If dental work or surgical procedure is deemed absolutely necessary, you will need to contact your surgeon as you will need to take antibiotics 1 hour prior to any dental work (including teeth cleanings).  · Please discuss with your surgeon prophylactic antibiotics as the length of time this intervention will be necessary for you varies with each  patient’s health history and situation.  · Avoid sick people. If you must be around someone who is ill, they should wear a mask.  · Avoid visits to the Emergency Room or Urgent Care unless you are having a life threatening event.   · If ordered stockings are to be placed on in the morning and removed at night. Monitor the stockings to ensure that any swelling is not causing the stockings to become too tight. In this case, remove stockings immediately.     IV. INCISION CARE:     · Wash your hands prior to dressing changes  · Change the dressing as needed to keep incision clean and dry. Utilize dry gauze and paper tape. Avoid touching the side of the gauze that goes against the incision with your hands.  · No creams or ointments to the incision  · May remove dressing once the incision is free of drainage  · Do not touch or pick at the incision  · Check incision every day and notify surgeon immediately if any of the following signs or symptoms are noted:  ? Increase in redness  ? Increase in swelling around the incision and of the entire extremity  ? Increase in pain  ? Drainage oozing from the incision  ? Pulling apart of the edges of the incision  ? Increase in overall body temperature (greater than 100.5 degrees)  · Your surgeon will instruct you regarding suture or staple removal     V. Medications:      1. Anticoagulants: You will be discharged on an anticoagulant. This is a prophylactic medication that helps prevent blood clots during your post-operative period. The type and length of dosage varies based on your individual needs, procedure performed, and surgeon’s preference.  · While taking the anticoagulant, you should avoid taking any additional aspirin, ibuprofen (Advil or Motrin), Aleve (Naprosyn) or other non-steroidal anti-inflammatory medications.   · Notify surgeon immediately if any daniel bleeding is noted in the urine, stool, emesis, or from the nose or the incision. Blood in the stool will often  appear as black rather than red. Blood in urine may appear as pink. Blood in emesis may appear as brown/black like coffee grounds.  · You will need to apply pressure for longer periods of time to any cuts or abrasions to stop bleeding  · Avoid alcohol while taking anticoagulants     2. Stool Softeners: You will be at greater risk of constipation after surgery due to being less mobile and the pain medications.   · Take stool softeners as instructed by your surgeon while on pain medications. Bran cereal is most effective. Over the counter Colace 100 mg 1-2 capsules twice daily.   · If stools become too loose or too frequent, please decreases the dosage or stop the stool softener.  · If constipation occurs despite use of stool softeners, you are to continue the stool softeners and add a laxative (Milk of Magnesia 1 ounce daily as needed)  · Drink plenty of fluids, and eat fruits and vegetables during your recovery time     3. Pain Medications utilized after surgery are narcotics and the law requires that the following information be given to all patients that are prescribed narcotics:  · CLASSIFICATION: Pain medications are called Opioids and are narcotics  · LEGALITIES: It is illegal to share narcotics with others and to drive within 24 hours of taking narcotics  · POTENTIAL SIDE EFFECTS: Potential side effects of opioids include: nausea, vomiting, itching, dizziness, drowsiness, dry mouth, constipation, and difficulty urinating.  · POTENTIAL ADVERSE EFFECTS:   ? Opioid tolerance can develop with use of pain medications and this simply means that it requires more and more of the medication to control pain; however, this is seen more in patients that use opioids for longer periods of time.  ? Opioid dependence can develop with use of Opioids and this simply means that to stop the medication can cause withdrawal symptoms; however, this is seen with patients that use Opioids for longer periods of time.  ? Opioid  addiction can develop with use of Opioids and the incidence of this is very unlikely in patients who take the medications as ordered and stop the medications as instructed.  ? Opioid overdose can be dangerous, but is unlikely when the medication is taken as ordered and stopped when ordered. It is important not to mix opioids with alcohol or with and type of sedative such as Benadryl as this can lead to over sedation and respiratory difficulty.  · DOSAGE:   ? Pain medications will need to be taken consistently for the first week to decrease pain and promote adequate pain relief and participation in physical therapy.  ? After the initial surgical pain begins to resolve, you may begin to decrease the pain medication. By the end of 6-8 weeks, you should be off of pain medications.  ? Refills will not be given by the office during evening hours, on weekends, or after 6-8 weeks post-op.  ? To seek refills on pain medications during the initial 6 week post-operative period, you must call the office 48 hours in advance to request the refill. The office will then notify you when to  the prescription. DO NOT wait until you are out of the medication to request a refill.     V. FOLLOW-UP VISITS:  · You will need to follow up in the office with your surgeon in 3 weeks. Please call this number [phone]  to schedule this appointment.  If you have any concerns or suspected complications prior to your follow up visit, please call your surgeons office. Do not wait until your appointment time if you suspect complications. These will need to be addressed in the office promptly.

## 2020-03-25 NOTE — ANESTHESIA POSTPROCEDURE EVALUATION
"Patient: Deborah Mcclelland    Procedure Summary     Date:  02/11/20 Room / Location:  [facility] OR  /  [facility]    Anesthesia Start:  1701 Anesthesia Stop:  1814    Procedure:  RIGHT TOTAL HIP ARTHROPLASTY ANTERIOR WITH HANA TABLE (Right Hip) Diagnosis:       Closed right hip fracture, initial encounter (CMS/[facility])      (Closed right hip fracture, initial encounter (CMS/[facility]) [S72.001A])    Surgeon:  Obed Serrano II, MD Provider:  Leoncio Curiel MD    Anesthesia Type:  general ASA Status:  3          Anesthesia Type: general    Vitals  Vitals Value Taken Time   /77 2/11/2020  8:00 PM   Temp 37.4 °C (99.3 °F) 2/11/2020  8:00 PM   Pulse 70 2/11/2020  8:00 PM   Resp 16 2/11/2020  8:00 PM   SpO2 97 % 2/11/2020  8:00 PM           Post Anesthesia Care and Evaluation    Patient location during evaluation: bedside  Patient participation: complete - patient participated  Level of consciousness: awake and alert  Pain management: adequate  Airway patency: patent  Anesthetic complications: No anesthetic complications    Cardiovascular status: acceptable  Respiratory status: acceptable  Hydration status: acceptable    Comments: /55 (BP Location: Left arm, Patient Position: Sitting)   Pulse 80   Temp 36.8 °C (98.3 °F) (Oral)   Resp 16   Ht 170.2 cm (67.01\")   Wt 79.7 kg (175 lb 9.6 oz)   SpO2 93%   BMI 27.50 kg/m²           "

## 2020-03-30 RX ORDER — HYDROCODONE BITARTRATE AND ACETAMINOPHEN 5; 325 MG/1; MG/1
1 TABLET ORAL EVERY 4 HOURS PRN
Qty: 30 TABLET | Refills: 0 | Status: SHIPPED | OUTPATIENT
Start: 2020-03-30

## 2020-05-08 RX ORDER — HYDROCODONE BITARTRATE AND ACETAMINOPHEN 5; 325 MG/1; MG/1
1 TABLET ORAL EVERY 4 HOURS PRN
Qty: 20 TABLET | Refills: 0 | Status: SHIPPED | OUTPATIENT
Start: 2020-05-08

## (undated) DEVICE — PK ANT HIP 40

## (undated) DEVICE — SOL NACL 0.9PCT 100ML SGL

## (undated) DEVICE — SUT VIC 0 CT1 36IN J946H

## (undated) DEVICE — ZIP 16 SURGICAL SKIN CLOSURE DEVICE, PSA: Brand: ZIP 16 SURGICAL SKIN CLOSURE DEVICE

## (undated) DEVICE — SUT ETHIB 2 CV V37 MS/4 30IN MX69G

## (undated) DEVICE — 3M™ IOBAN™ 2 ANTIMICROBIAL INCISE DRAPE 6640EZ: Brand: IOBAN™ 2

## (undated) DEVICE — DRSNG BURN ACTICOAT FLEX 7 1X24IN

## (undated) DEVICE — DRAPE,REIN 53X77,STERILE: Brand: MEDLINE

## (undated) DEVICE — MAT FLR ABSORBENT LG 4FT 10 2.5FT

## (undated) DEVICE — GLV SURG SENSICARE PI PF LF 8.5 GRN STRL

## (undated) DEVICE — APPL CHLORAPREP W/TINT 26ML ORNG

## (undated) DEVICE — GLV SURG PREMIERPRO ORTHO LTX PF SZ8.5 BRN

## (undated) DEVICE — NEEDLE, QUINCKE, 20GX3.5": Brand: MEDLINE

## (undated) DEVICE — RECIPROCATING BLADE HEAVY DUTY LONG, OFFSET  (77.6 X 0.77 X 11.2MM)

## (undated) DEVICE — SOL ISO/ALC RUB 70PCT 4OZ

## (undated) DEVICE — S/M FLEXIBLE ALEXIS ORTHOPAEDIC PROTECTOR: Brand: ALEXIS® ORTHOPAEDIC PROTECTOR